# Patient Record
Sex: FEMALE | Race: BLACK OR AFRICAN AMERICAN | Employment: OTHER | URBAN - METROPOLITAN AREA
[De-identification: names, ages, dates, MRNs, and addresses within clinical notes are randomized per-mention and may not be internally consistent; named-entity substitution may affect disease eponyms.]

---

## 2018-03-29 LAB
CREATININE, EXTERNAL: 0.92
HBA1C MFR BLD HPLC: 5.7 %
LDL-C, EXTERNAL: 132

## 2018-05-30 ENCOUNTER — OFFICE VISIT (OUTPATIENT)
Dept: INTERNAL MEDICINE CLINIC | Age: 58
End: 2018-05-30

## 2018-05-30 VITALS
HEIGHT: 62 IN | DIASTOLIC BLOOD PRESSURE: 82 MMHG | OXYGEN SATURATION: 98 % | HEART RATE: 77 BPM | WEIGHT: 183.8 LBS | TEMPERATURE: 98.2 F | SYSTOLIC BLOOD PRESSURE: 118 MMHG | BODY MASS INDEX: 33.82 KG/M2 | RESPIRATION RATE: 15 BRPM

## 2018-05-30 DIAGNOSIS — R21 FACIAL RASH: ICD-10-CM

## 2018-05-30 DIAGNOSIS — A60.00 GENITAL HERPES SIMPLEX, UNSPECIFIED SITE: ICD-10-CM

## 2018-05-30 DIAGNOSIS — K75.81 NASH (NONALCOHOLIC STEATOHEPATITIS): ICD-10-CM

## 2018-05-30 DIAGNOSIS — L80 VITILIGO: ICD-10-CM

## 2018-05-30 DIAGNOSIS — L30.9 ECZEMA, UNSPECIFIED TYPE: Primary | ICD-10-CM

## 2018-05-30 RX ORDER — TRIAMCINOLONE ACETONIDE 1 MG/G
CREAM TOPICAL 2 TIMES DAILY
Qty: 45 G | Refills: 11 | Status: SHIPPED | OUTPATIENT
Start: 2018-05-30 | End: 2022-09-29

## 2018-05-30 RX ORDER — IBUPROFEN 600 MG/1
TABLET ORAL
COMMUNITY

## 2018-05-30 RX ORDER — VALACYCLOVIR HYDROCHLORIDE 500 MG/1
TABLET, FILM COATED ORAL 2 TIMES DAILY
COMMUNITY
End: 2018-07-30 | Stop reason: SDUPTHER

## 2018-05-30 RX ORDER — POLYETHYLENE GLYCOL 3350 17 G/17G
17 POWDER, FOR SOLUTION ORAL
COMMUNITY
End: 2021-07-14

## 2018-05-30 RX ORDER — VITAMIN E 268 MG
400 CAPSULE ORAL DAILY
COMMUNITY
End: 2021-07-14

## 2018-05-30 NOTE — MR AVS SNAPSHOT
Blu Kurk 
 
 
 [address] [facility], [address] 
[phone] Patient: Jon Ventura MRN: [id number] Visit Information Date & Time Provider Department Dept. Phone Encounter #  
 2018  2:00 PM Gem Kirby MD [facility] [address] [place] [address] [phone] [id number] Follow-up Instructions Return in about 2 months (around 2018) for rash. Your Appointments 2018  2:00 PM  
Office Visit with Gem Kirby MD  
[facility] [address] [place] [address] [address] [facility]) Appt Note: 2 month f/u  
 [address] [facility], [address] Alphonso Cervantes [address]  
  
   
 [address] [facility] Upcoming Health Maintenance Date Due Hepatitis C Screening 1960 DTaP/Tdap/Td series (1 - Tdap) 1983 PAP AKA CERVICAL CYTOLOGY 1983 BREAST CANCER SCRN MAMMOGRAM 2012 FOBT Q 1 YEAR AGE 50-75 2012 Influenza Age 5 to Adult 2018 Allergies as of 2018  Review Complete On: 2018 By: Gem Kirby MD  
 No Known Allergies Current Immunizations  Never Reviewed No immunizations on file. Not reviewed this visit You Were Diagnosed With   
  
 Codes Comments Eczema, unspecified type    -  Primary ICD-10-CM: L30.9 ICD-9-CM: 692.9 Vitiligo     ICD-10-CM: L80 
ICD-9-CM: 709.01 Facial rash     ICD-10-CM: R21 
ICD-9-CM: 782.1 Vitals BP Pulse Temp Resp Height(growth percentile) Weight(growth percentile) 118/82 (BP 1 Location: Left arm, BP Patient Position: Sitting) 77 98.2 °F (36.8 °C) (Oral) 15 5' 2\" (1.575 m) 183 lb 12.8 oz (83.4 kg) SpO2 BMI Smoking Status 98% 33.62 kg/m2 Never Smoker Vitals History BMI and BSA Data Body Mass Index Body Surface Area  
 33.62 kg/m 2 1.91 m 2 Preferred Pharmacy Pharmacy Name Phone [address] [place] [address] [phone] Your Updated Medication List  
  
   
This list is accurate as of 5/30/18  2:48 PM.  Always use your most recent med list.  
  
  
  
  
 ibuprofen 600 mg tablet Commonly known as:  MOTRIN Take  by mouth every six (6) hours as needed for Pain. MIRALAX 17 gram packet Generic drug:  polyethylene glycol Take 17 g by mouth daily. triamcinolone acetonide 0.1 % topical cream  
Commonly known as:  KENALOG Apply  to affected area two (2) times a day. use thin layer TUMS PO Take  by mouth. VALTREX 500 mg tablet Generic drug:  valACYclovir Take  by mouth two (2) times a day. vitamin E 400 unit capsule Commonly known as:  [address] Take  by mouth daily. Prescriptions Sent to Pharmacy Refills  
 triamcinolone acetonide (KENALOG) 0.1 % topical cream 11 Sig: Apply  to affected area two (2) times a day. use thin layer Class: Normal  
 Pharmacy: [address] [place] [address] #: [phone] Route: Topical  
  
We Performed the Following REFERRAL TO DERMATOLOGY [REF19 Custom] Follow-up Instructions Return in about 2 months (around 7/30/2018) for rash. Referral Information Referral ID Referred By Referred To  
  
 [id number] Wicho Haney Not Available Visits Status Start Date End Date 1 New Request 5/30/18 5/30/19 If your referral has a status of pending review or denied, additional information will be sent to support the outcome of this decision. Patient Instructions Atopic Dermatitis: Care Instructions Your Care Instructions Atopic dermatitis (also called eczema) is a skin problem that causes intense itching and a red, raised rash. In severe cases, the rash develops clear fluid-filled blisters. The rash is not contagious.  People with this condition seem to have very sensitive immune systems that are likely to react to things that cause allergies. The immune system is the body's way of fighting infection. There is no cure for atopic dermatitis, but you may be able to control it with care at home. Follow-up care is a key part of your treatment and safety. Be sure to make and go to all appointments, and call your doctor if you are having problems. It's also a good idea to know your test results and keep a list of the medicines you take. How can you care for yourself at home? · Use moisturizer at least twice a day. · If your doctor prescribes a cream, use it as directed. If your doctor prescribes other medicine, take it exactly as directed. · Wash the affected area with water only. Soap can make dryness and itching worse. Pat dry. · Apply a moisturizer after bathing. Use a cream such as Lubriderm, Moisturel, or Cetaphil that does not irritate the skin or cause a rash. Apply the cream while your skin is still damp after lightly drying with a towel. · Use cold, wet cloths to reduce itching. · Keep cool, and stay out of the sun. · If itching affects your normal activities, an over-the-counter antihistamine, such as diphenhydramine (Benadryl) or loratadine (Claritin) may help. Read and follow all instructions on the label. When should you call for help? Call your doctor now or seek immediate medical care if: 
? · Your rash gets worse and you have a fever. ? · You have new blisters or bruises, or the rash spreads and looks like a sunburn. ? · You have signs of infection, such as: 
¨ Increased pain, swelling, warmth, or redness. ¨ Red streaks leading from the rash. ¨ Pus draining from the rash. ¨ A fever. ? · You have crusting or oozing sores. ? · You have joint aches or body aches along with your rash. ? Watch closely for changes in your health, and be sure to contact your doctor if: ? · Your rash does not clear up after 2 to 3 weeks of home treatment. ? · Itching interferes with your sleep or daily activities. Where can you learn more? Go to http://yadi-roderick.info/. Enter Q206 in the search box to learn more about \"Atopic Dermatitis: Care Instructions. \" Current as of: October 13, 2016 Content Version: 11.4 © 8152-5362 [employer]. Care instructions adapted under license by [employer] (which disclaims liability or warranty for this information). If you have questions about a medical condition or this instruction, always ask your healthcare professional. [address] any warranty or liability for your use of this information. Vitiligo: Care Instructions Your Care Instructions Vitiligo (say \"vi-tuh-LY-go\") is a skin problem that happens when cells that make pigment are destroyed. Pigment gives skin its color. You may have white patches on areas of your body. The hair in these places may turn white. Sometimes, the white patches spread. Doctors don't know what causes this problem. It may run in families. This means that a child may be more likely to get it if a parent has it. If you decide to treat this problem, treatment can take a long time to work, and it may not work at all. Follow-up care is a key part of your treatment and safety. Be sure to make and go to all appointments, and call your doctor if you are having problems. It's also a good idea to know your test results and keep a list of the medicines you take. How can you care for yourself at home? · Put creams or ointments on your skin as directed by your doctor. Be careful if you put them around your eyes, nose, or mouth. · Take your medicines exactly as prescribed. Call your doctor if you have any problems with your medicine. · If you have light therapy, your skin will be exposed to a special light. Follow your doctor's directions on caring for your skin. · Protect your skin from the sun. It is most important to protect the white patches. Use sunscreen, hats with wide brims, sunglasses, and clothing that covers your arms and legs. · Talk to your doctor about sunless tanning products. You can buy these without a prescription. When should you call for help? Watch closely for changes in your health, and be sure to contact your doctor if: 
? · The skin changes are getting worse. ? · You do not get better as expected. Where can you learn more? Go to http://yadi-roderick.info/. Enter B241 in the search box to learn more about \"Vitiligo: Care Instructions. \" Current as of: October 13, 2016 Content Version: 11.4 © 0333-2186 [employer]. Care instructions adapted under license by [employer] (which disclaims liability or warranty for this information). If you have questions about a medical condition or this instruction, always ask your healthcare professional. [address] any warranty or liability for your use of this information. Introducing [facility]! Thelma Nieto introduces Cubeacon patient portal. Now you can access parts of your medical record, email your doctor's office, and request medication refills online. 1. In your internet browser, go to [email or web address]://[email or web address]. [employer] 2. Click on the First Time User? Click Here link in the Sign In box. You will see the New Member Sign Up page. 3. Enter your Cubeacon Access Code exactly as it appears below. You will not need to use this code after youve completed the sign-up process. If you do not sign up before the expiration date, you must request a new code. · Cubeacon Access Code: [email or web address] Expires: 8/28/2018  1:34 PM 
 
4.  Enter the last four digits of your Social Security Number (xxxx) and Date of Birth (mm/dd/yyyy) as indicated and click Submit. You will be taken to the next sign-up page. 5. Create a [employer] ID. This will be your [employer] login ID and cannot be changed, so think of one that is secure and easy to remember. 6. Create a [employer] password. You can change your password at any time. 7. Enter your Password Reset Question and Answer. This can be used at a later time if you forget your password. 8. Enter your e-mail address. You will receive e-mail notification when new information is available in [address] 9. Click Sign Up. You can now view and download portions of your medical record. 10. Click the Download Summary menu link to download a portable copy of your medical information. If you have questions, please visit the Frequently Asked Questions section of the [employer] website. Remember, [employer] is NOT to be used for urgent needs. For medical emergencies, dial 911. Now available from your iPhone and Android! Please provide this summary of care documentation to your next provider. Your primary care clinician is listed as Iris Elmore. If you have any questions after today's visit, please call [phone].

## 2018-05-30 NOTE — PATIENT INSTRUCTIONS
Atopic Dermatitis: Care Instructions  Your Care Instructions  Atopic dermatitis (also called eczema) is a skin problem that causes intense itching and a red, raised rash. In severe cases, the rash develops clear fluid-filled blisters. The rash is not contagious. People with this condition seem to have very sensitive immune systems that are likely to react to things that cause allergies. The immune system is the body's way of fighting infection. There is no cure for atopic dermatitis, but you may be able to control it with care at home. Follow-up care is a key part of your treatment and safety. Be sure to make and go to all appointments, and call your doctor if you are having problems. It's also a good idea to know your test results and keep a list of the medicines you take. How can you care for yourself at home? · Use moisturizer at least twice a day. · If your doctor prescribes a cream, use it as directed. If your doctor prescribes other medicine, take it exactly as directed. · Wash the affected area with water only. Soap can make dryness and itching worse. Pat dry. · Apply a moisturizer after bathing. Use a cream such as Lubriderm, Moisturel, or Cetaphil that does not irritate the skin or cause a rash. Apply the cream while your skin is still damp after lightly drying with a towel. · Use cold, wet cloths to reduce itching. · Keep cool, and stay out of the sun. · If itching affects your normal activities, an over-the-counter antihistamine, such as diphenhydramine (Benadryl) or loratadine (Claritin) may help. Read and follow all instructions on the label. When should you call for help? Call your doctor now or seek immediate medical care if:  ? · Your rash gets worse and you have a fever. ? · You have new blisters or bruises, or the rash spreads and looks like a sunburn. ? · You have signs of infection, such as:  ¨ Increased pain, swelling, warmth, or redness.   ¨ Red streaks leading from the rash.  ¨ Pus draining from the rash. ¨ A fever. ? · You have crusting or oozing sores. ? · You have joint aches or body aches along with your rash. ? Watch closely for changes in your health, and be sure to contact your doctor if:  ? · Your rash does not clear up after 2 to 3 weeks of home treatment. ? · Itching interferes with your sleep or daily activities. Where can you learn more? Go to http://yadi-roderick.info/. Enter S370 in the search box to learn more about \"Atopic Dermatitis: Care Instructions. \"  Current as of: October 13, 2016  Content Version: 11.4  © 9102-0665 [employer]. Care instructions adapted under license by [employer] (which disclaims liability or warranty for this information). If you have questions about a medical condition or this instruction, always ask your healthcare professional. [address] any warranty or liability for your use of this information. Vitiligo: Care Instructions  Your Care Instructions  Vitiligo (say \"vi-tuh-LY-go\") is a skin problem that happens when cells that make pigment are destroyed. Pigment gives skin its color. You may have white patches on areas of your body. The hair in these places may turn white. Sometimes, the white patches spread. Doctors don't know what causes this problem. It may run in families. This means that a child may be more likely to get it if a parent has it. If you decide to treat this problem, treatment can take a long time to work, and it may not work at all. Follow-up care is a key part of your treatment and safety. Be sure to make and go to all appointments, and call your doctor if you are having problems. It's also a good idea to know your test results and keep a list of the medicines you take. How can you care for yourself at home? · Put creams or ointments on your skin as directed by your doctor.  Be careful if you put them around your eyes, nose, or mouth.  · Take your medicines exactly as prescribed. Call your doctor if you have any problems with your medicine. · If you have light therapy, your skin will be exposed to a special light. Follow your doctor's directions on caring for your skin. · Protect your skin from the sun. It is most important to protect the white patches. Use sunscreen, hats with wide brims, sunglasses, and clothing that covers your arms and legs. · Talk to your doctor about sunless tanning products. You can buy these without a prescription. When should you call for help? Watch closely for changes in your health, and be sure to contact your doctor if:  ? · The skin changes are getting worse. ? · You do not get better as expected. Where can you learn more? Go to http://yadi-roderick.info/. Enter B241 in the search box to learn more about \"Vitiligo: Care Instructions. \"  Current as of: October 13, 2016  Content Version: 11.4  © 3750-9291 [employer]wise[employer]. Care instructions adapted under license by [employer] (which disclaims liability or warranty for this information). If you have questions about a medical condition or this instruction, always ask your healthcare professional. [address] any warranty or liability for your use of this information.

## 2018-05-30 NOTE — PROGRESS NOTES
Chief Complaint   Patient presents with   [facility] Establish Care    Other     spots on face, times 3 months, \"getting bigger\"

## 2018-05-30 NOTE — PROGRESS NOTES
[facility]:  5/31/2018, MRN: [id number]      Shilpa Obrien is a 66 y.o. female and presents to clinic to Silas Puente and Other (spots on face, times 3 months, \"getting bigger\")    Subjective: The patient is a 79-year-old female with history of eczema, vitiligo, genital herpes, abnormal Pap smear, and BRAGG. 1.  Facial Rash: The patient reports swelling under bilateral orbits for 6 months. There is a left bump under her left eye is nontender to palpation. It does not itch. No alleviating factors are known for the swollen areas and the bump inferior to her left orbit. No aggravating factors are known. She has no sneezing, cough, itchy or red eyes, or ear pain. No hearing loss. No vision changes. 2.  Vitiligo: Present along her left lower extremity for several months at least.  Not associated with pain or itching. No alleviating factors are known. No triggers are known. No aggravating factors are known. 3.  Eczema: Present for 2 years along her right lower extremity. The area it is associated with pruritus. The patient admits to scratching this area quite frequently. No triggers are known. 4. BRAGG:  Diagnosed in 2017. She does not regularly exercise. She is on a heart healthy diet. Her weight today is 183 pounds. 5.  Genital Herpes History: The patient takes Valtrex as needed. She reports no adverse side effects from this medication. In the past, she had a rash along her genitalia that was thought to be secondary to herpes. She is asymptomatic today. 6.  Health Maintenance:  -Her last Pap smear was in 2013. She states that she had an abnormal Pap smear when younger. She never had colposcopy. Follow-up Pap smears nevertheless were unremarkable.  -Her last colonoscopy was just a month ago. Per her history was unremarkable. She has a history of colon polyps though. -Her last mammogram was in March of this year. It was unremarkable per patient history.   She denies history of abnormal mammograms  -No tobacco use. No drug use. No energy drink consumption. No alcohol beverage consumption. Patient Active Problem List    Diagnosis Date Noted    Eczema 05/31/2018    Vitiligo 05/31/2018    Facial rash 05/31/2018    BRAGG (nonalcoholic steatohepatitis) 05/31/2018    Genital herpes 05/31/2018       Current Outpatient Prescriptions   Medication Sig Dispense Refill    polyethylene glycol (MIRALAX) 17 gram packet Take 17 g by mouth daily.  ibuprofen (MOTRIN) 600 mg tablet Take  by mouth every six (6) hours as needed for Pain.  valACYclovir (VALTREX) 500 mg tablet Take  by mouth two (2) times a day.  calcium carbonate (TUMS PO) Take  by mouth.  vitamin E (AQUA GEMS) 400 unit capsule Take  by mouth daily.  triamcinolone acetonide (KENALOG) 0.1 % topical cream Apply  to affected area two (2) times a day. use thin layer 45 g 11       No Known Allergies    Past Medical History:   Diagnosis Date    Fatty liver     GERD (gastroesophageal reflux disease)        History reviewed. No pertinent surgical history. Family History   Problem Relation Age of Onset    Hypertension Mother     Heart Disease Mother     Diabetes Father     Stroke Father     Heart Attack Father     Diabetes Sister     Diabetes Brother     Heart Attack Brother        Social History   Substance Use Topics    Smoking status: Never Smoker    Smokeless tobacco: Never Used    Alcohol use No       ROS   Review of Systems   Constitutional: Negative for chills and fever. HENT: Negative for ear pain and sore throat. Eyes: Negative for blurred vision and pain. Respiratory: Negative for cough and shortness of breath. Cardiovascular: Negative for chest pain. Gastrointestinal: Negative for abdominal pain, blood in stool and melena. Genitourinary: Negative for dysuria and hematuria. Musculoskeletal: Negative for joint pain and myalgias.    Skin: Positive for itching and rash. Neurological: Negative for tingling, focal weakness and headaches. Endo/Heme/Allergies: Does not bruise/bleed easily. Psychiatric/Behavioral: Negative for substance abuse. Objective     Vitals:    05/30/18 1357   BP: 118/82   Pulse: 77   Resp: 15   Temp: 98.2 °F (36.8 °C)   TempSrc: Oral   SpO2: 98%   Weight: 183 lb 12.8 oz (83.4 kg)   Height: 5' 2\" (1.575 m)   PainSc:   0 - No pain       Physical Exam   Constitutional: She is oriented to person, place, and time and well-developed, well-nourished, and in no distress. HENT:   Head: Normocephalic and atraumatic. Right Ear: External ear normal.   Left Ear: External ear normal.   Nose: Nose normal.   Mouth/Throat: Oropharynx is clear and moist. No oropharyngeal exudate. +B/l allergic shiners. Clear TMs   Eyes: Conjunctivae and EOM are normal. Pupils are equal, round, and reactive to light. Right eye exhibits no discharge. Left eye exhibits no discharge. No scleral icterus. Neck: Neck supple. Cardiovascular: Normal rate, regular rhythm, normal heart sounds and intact distal pulses. Exam reveals no gallop and no friction rub. No murmur heard. Pulmonary/Chest: Effort normal and breath sounds normal. No respiratory distress. She has no wheezes. She has no rales. Abdominal: Soft. Bowel sounds are normal. She exhibits no distension. There is no tenderness. There is no rebound and no guarding. No organomegaly   Musculoskeletal: She exhibits no edema or tenderness (BUE). Lymphadenopathy:     She has no cervical adenopathy. Neurological: She is alert and oriented to person, place, and time. She exhibits normal muscle tone. Gait normal.   Skin: Skin is warm and dry. No erythema. There is very minimal edema of the bilateral orbits. There is no erythema. She has a small papule under her left orbit. It is nontender to palpation. There are hypopigmented spots along her left lower extremity. There are nontender to palpation.   There is no erythema. She has hyperpigmentation along her right lower extremity tibial bone, nontender palpation. There is no erythema present. Psychiatric: Affect normal.   Nursing note and vitals reviewed. Assessment/Plan:   1. Eczema: Along her RLE  -Prescribing triamcinolone cream.  -Placing a referral to Dermatology per pt request.    ORDERS:  - triamcinolone acetonide (KENALOG) 0.1 % topical cream; Apply  to affected area two (2) times a day. use thin layer  Dispense: 45 g; Refill: 11  - REFERRAL TO DERMATOLOGY    2. Vitiligo: Along her LLE.  - Placing a referral to Dermatology per pt hx. We discussed the diagnosis of vitiligo. All questions were answered. ORDERS:  - REFERRAL TO DERMATOLOGY    3. Facial rash: From seasonal allergies?  -I instructed her to take a nasal steroid over-the-counter for presumed seasonal allergies. -I am also placing a referral to dermatology per patient request.    ORDERS:  - REFERRAL TO DERMATOLOGY    4.H/o Genital Herpes: Stable.  -Okay to continue with Valtrex as needed. Observation. 5. BRAGG: Her weight is 183lbs. -I encouraged her to reduce her fat food intake and to exercise regularly as a means of resolving her BRAGG. We discussed the diagnosis. All questions were answered. I will recheck her weight at her follow-up appointment. 6.  Health Maintenance:  -Requesting records from her previous PCP, including her vaccine records, most recent results of labs (per patient history, these were done within the past year), her last mammogram, last colonoscopy, and last Pap smear.         Health Maintenance Due   Topic Date Due    Hepatitis C Screening  1960    DTaP/Tdap/Td series (1 - Tdap) 01/21/1983    PAP AKA CERVICAL CYTOLOGY  01/21/1983    BREAST CANCER SCRN MAMMOGRAM  01/21/2012    FOBT Q 1 YEAR AGE 50-75  01/21/2012     Lab review: labs are reviewed in the EHR    I have discussed the diagnosis with the patient and the intended plan as seen in the above orders. The patient has received an after-visit summary and questions were answered concerning future plans. I have discussed medication side effects and warnings with the patient as well. I have reviewed the plan of care with the patient, accepted their input and they are in agreement with the treatment goals. All questions were answered. The patient understands the plan of care. Handouts provided today with above information. Pt instructed if symptoms worsen to call the office or report to the ED for continued care. Greater than 50% of the visit time was spent in counseling and/or coordination of care. Follow-up Disposition:  Return in about 2 months (around 7/30/2018) for rash.     Al Chavez MD

## 2018-05-31 PROBLEM — K75.81 NASH (NONALCOHOLIC STEATOHEPATITIS): Status: ACTIVE | Noted: 2018-05-31

## 2018-05-31 PROBLEM — L80 VITILIGO: Status: ACTIVE | Noted: 2018-05-31

## 2018-05-31 PROBLEM — R21 FACIAL RASH: Status: ACTIVE | Noted: 2018-05-31

## 2018-05-31 PROBLEM — A60.00 GENITAL HERPES: Status: ACTIVE | Noted: 2018-05-31

## 2018-05-31 PROBLEM — L30.9 ECZEMA: Status: ACTIVE | Noted: 2018-05-31

## 2018-06-08 ENCOUNTER — TELEPHONE (OUTPATIENT)
Dept: INTERNAL MEDICINE CLINIC | Age: 58
End: 2018-06-08

## 2018-06-08 NOTE — TELEPHONE ENCOUNTER
Patient's insurance has denied [facility] they have sent her to [facility] dermatology message left on voicemail notifying patient of this

## 2018-06-08 NOTE — TELEPHONE ENCOUNTER
Pt says she was waiting on referral for derm. Referral tab says waiting for reply from [facility]. Please call pt with update.

## 2018-06-11 NOTE — TELEPHONE ENCOUNTER
LMTCB    Please notify the patient that insurance referral to [facility] has been denied. Patient approved at:  [address] [place] [address] [facility]  [facility]. Elissa Marshall [address]  Telephone: [phone]  MTF will contact patient to schedule an appointment.

## 2018-07-30 ENCOUNTER — OFFICE VISIT (OUTPATIENT)
Dept: INTERNAL MEDICINE CLINIC | Age: 58
End: 2018-07-30

## 2018-07-30 VITALS
BODY MASS INDEX: 33.57 KG/M2 | DIASTOLIC BLOOD PRESSURE: 81 MMHG | WEIGHT: 182.4 LBS | HEIGHT: 62 IN | SYSTOLIC BLOOD PRESSURE: 108 MMHG | TEMPERATURE: 98.3 F | RESPIRATION RATE: 12 BRPM | OXYGEN SATURATION: 96 % | HEART RATE: 79 BPM

## 2018-07-30 DIAGNOSIS — R21 FACIAL RASH: ICD-10-CM

## 2018-07-30 DIAGNOSIS — A60.00 GENITAL HERPES SIMPLEX, UNSPECIFIED SITE: Primary | ICD-10-CM

## 2018-07-30 DIAGNOSIS — Z12.4 ENCOUNTER FOR SCREENING FOR CERVICAL CANCER: ICD-10-CM

## 2018-07-30 DIAGNOSIS — Z13.220 SCREENING FOR HYPERLIPIDEMIA: ICD-10-CM

## 2018-07-30 DIAGNOSIS — K75.81 NASH (NONALCOHOLIC STEATOHEPATITIS): ICD-10-CM

## 2018-07-30 DIAGNOSIS — Z13.0 SCREENING FOR DEFICIENCY ANEMIA: ICD-10-CM

## 2018-07-30 DIAGNOSIS — R73.9 HYPERGLYCEMIA: ICD-10-CM

## 2018-07-30 DIAGNOSIS — Z11.59 NEED FOR HEPATITIS C SCREENING TEST: ICD-10-CM

## 2018-07-30 DIAGNOSIS — L30.9 ECZEMA, UNSPECIFIED TYPE: ICD-10-CM

## 2018-07-30 RX ORDER — VALACYCLOVIR HYDROCHLORIDE 500 MG/1
TABLET, FILM COATED ORAL
Qty: 60 TAB | Refills: 2 | Status: SHIPPED | OUTPATIENT
Start: 2018-07-30 | End: 2022-09-29

## 2018-07-30 NOTE — MR AVS SNAPSHOT
[facility] 
 
 
 [address], [address] [facility] 
[phone] Patient: Darlin Paz MRN: [id number] TMD:[birth year] Visit Information Date & Time Provider Department Dept. Phone Encounter #  
 7/30/2018  2:00 PM [facility]tha Lr MD [facility] [phone] [id number] Follow-up Instructions Return in about 11 months (around 7/9/2019) for check up. Your Appointments 7/2/2019  8:15 AM  
LAB with [facility] NURSE VISIT [facility] Appt Note: fasting labs [address], [address] [address] [address]  
  
   
 [address], [address]  
  
    
 7/9/2019  1:45 PM  
PHYSICAL with Aretha Lr MD  
[facility]) Appt Note: CPE  
 [address], [facility] [address] [address]  
  
   
 [address], [address] Upcoming Health Maintenance Date Due Hepatitis C Screening 1960 COLONOSCOPY 1/21/1980 PAP AKA CERVICAL CYTOLOGY 1/21/1983 DTaP/Tdap/Td series (2 - Td) 5/23/2018 Influenza Age 5 to Adult 8/1/2018 BREAST CANCER SCRN MAMMOGRAM 3/9/2020 Allergies as of 7/30/2018  Review Complete On: 7/30/2018 By: Aretha Lr MD  
 No Known Allergies Current Immunizations  Reviewed on 6/18/2018 Name Date Anthrax Vaccine 7/14/2003, 1/23/2003, 1/3/2003 Hep A Vaccine 10/29/1998, 10/30/1997 Hep B Vaccine 5/10/1989 IPV 10/7/1998, 10/7/1989, 3/9/1987, 2/26/1987 Influenza Vaccine 12/15/2006 TB Skin Test (PPD) Intradermal 6/6/2011 Td 1/30/1998 Tdap 5/23/2008 Typhoid Vaccine 12/10/2002, 6/19/2000 Yellow Fever Vaccine 1/30/1998 Not reviewed this visit You Were Diagnosed With   
  
 Codes Comments Eczema, unspecified type    -  Primary ICD-10-CM: L30.9 ICD-9-CM: 692.9 Hyperglycemia     ICD-10-CM: R73.9 ICD-9-CM: 790.29 Screening for hyperlipidemia     ICD-10-CM: Z13.220 ICD-9-CM: V77.91   
 BRAGG (nonalcoholic steatohepatitis)     ICD-10-CM: V58.02 ICD-9-CM: 571.8 Screening for deficiency anemia     ICD-10-CM: Z13.0 ICD-9-CM: V78.1 Facial rash     ICD-10-CM: R21 
ICD-9-CM: 782.1 Genital herpes simplex, unspecified site     ICD-10-CM: A60.00 ICD-9-CM: 054.10 Encounter for screening for cervical cancer      ICD-10-CM: Z12.4 ICD-9-CM: V76.2 Need for hepatitis C screening test     ICD-10-CM: Z11.59 
ICD-9-CM: V73.89 Vitals BP Pulse Temp Resp Height(growth percentile) Weight(growth percentile) 108/81 79 98.3 °F (36.8 °C) (Oral) 12 5' 2\" (1.575 m) 182 lb 6.4 oz (82.7 kg) SpO2 BMI Smoking Status 96% 33.36 kg/m2 Never Smoker Vitals History BMI and BSA Data Body Mass Index Body Surface Area  
 33.36 kg/m 2 1.9 m 2 Preferred Pharmacy Pharmacy Name Phone [address] [place] [address], [address] [place] [address] [phone] Your Updated Medication List  
  
   
This list is accurate as of 7/30/18  2:42 PM.  Always use your most recent med list.  
  
  
  
  
 ibuprofen 600 mg tablet Commonly known as:  MOTRIN Take  by mouth every six (6) hours as needed for Pain. MIRALAX 17 gram packet Generic drug:  polyethylene glycol Take 17 g by mouth daily. triamcinolone acetonide 0.1 % topical cream  
Commonly known as:  KENALOG Apply  to affected area two (2) times a day. use thin layer TUMS PO Take  by mouth. valACYclovir 500 mg tablet Commonly known as:  VALTREX You can take 500mg twice a day for 3 days as needed for each flair up.  
  
 varicella-zoster recombinant (PF) 50 mcg/0.5 mL Susr injection Commonly known as:  SHINGRIX (PF)  
0.5 mL by IntraMUSCular route every two (2) months for 2 doses. vitamin E 400 unit capsule Commonly known as:  [address] Take  by mouth daily. Prescriptions Printed Refills  
 varicella-zoster recombinant, PF, (SHINGRIX, PF,) 50 mcg/0.5 mL susr injection 1 Si.5 mL by IntraMUSCular route every two (2) months for 2 doses. Class: Print Route: IntraMUSCular Prescriptions Sent to Pharmacy Refills  
 valACYclovir (VALTREX) 500 mg tablet 2 Sig: You can take 500mg twice a day for 3 days as needed for each flair up. Class: Normal  
 Pharmacy: [address] [place] [address] #: [phone] We Performed the Following REFERRAL TO DERMATOLOGY [REF19 Custom] Comments: For a second opinion REFERRAL TO GYNECOLOGY [REF30 Custom] Follow-up Instructions Return in about 11 months (around 2019) for check up. To-Do List   
 2018 Lab:  HEPATITIS C AB   
  
 2019 Lab:  CBC WITH AUTOMATED DIFF Around 2019 Lab:  HEMOGLOBIN A1C W/O EAG   
  
 2019 Lab:  LIPID PANEL   
  
 2019 Lab:  METABOLIC PANEL, COMPREHENSIVE Referral Information Referral ID Referred By Referred To  
  
 [id number] Meli Mckeon Not Available Visits Status Start Date End Date 1 New Request 18 If your referral has a status of pending review or denied, additional information will be sent to support the outcome of this decision. Referral ID Referred By Referred To  
 [id number] Meli Roque Not Available Visits Status Start Date End Date 1 New Request 18 If your referral has a status of pending review or denied, additional information will be sent to support the outcome of this decision. Introducing [facility]! [facility] introduces [employer] patient portal. Now you can access parts of your medical record, email your doctor's office, and request medication refills online. 1. In your internet browser, go to [email or web address] 2. Click on the First Time User? Click Here link in the Sign In box. You will see the New Member Sign Up page. 3. Enter your [employer] Access Code exactly as it appears below. You will not need to use this code after youve completed the sign-up process. If you do not sign up before the expiration date, you must request a new code. · [employer] Access Code: [email or web address] Expires: 8/28/2018  1:34 PM 
 
4. Enter the last four digits of your Social Security Number (xxxx) and Date of Birth (mm/dd/yyyy) as indicated and click Submit. You will be taken to the next sign-up page. 5. Create a myMatrixxt ID. This will be your [employer] login ID and cannot be changed, so think of one that is secure and easy to remember. 6. Create a [employer] password. You can change your password at any time. 7. Enter your Password Reset Question and Answer. This can be used at a later time if you forget your password. 8. Enter your e-mail address. You will receive e-mail notification when new information is available in [address] 9. Click Sign Up. You can now view and download portions of your medical record. 10. Click the Download Summary menu link to download a portable copy of your medical information. If you have questions, please visit the Frequently Asked Questions section of the [employer] website. Remember, [employer] is NOT to be used for urgent needs. For medical emergencies, dial 911. Now available from your iPhone and Android! Please provide this summary of care documentation to your next provider. Your primary care clinician is listed as Stevie Maloney. If you have any questions after today's visit, please call [phone].

## 2018-07-30 NOTE — PROGRESS NOTES
[facility]:  7/30/2018, MRN: [id number]      Chun Reeves is a 66 y.o. female and presents to clinic for Dry Skin (Patient here for eczema and says  the treat from derm Dr did not  help.)    Subjective: The patient is a 72-year-old female with history of eczema, vitiligo, genital herpes, abnormal Pap smear, and BRAGG. 1. Eczema: She was referred to Dermatology at her last apt for an eczematous rash along her RLE. Triamcinolone topical rx was prescribed for symptomatic relief of associated pruritus. She went to the dermatology team since her last appointment he did not think any additional treatments were warranted for her skin findings. 2. BRAGG and Obesity: She was diagnosed with BRAGG in 2017. No excessive ETOH intake. Her last A1C measured 5.7. Her weight today is 182lbs. Wt Readings from Last 3 Encounters:   07/30/18 182 lb 6.4 oz (82.7 kg)   05/30/18 183 lb 12.8 oz (83.4 kg)     3. Facial Rash: Patient reported a six-month history of swelling along bilateral orbits. She also has hypopigmented areas under bilateral orbits. No alleviating factors are known. No aggravating factors are known. No allergy symptoms. No hearing loss. No vision changes. The areas are not associated with pain. The patient is interested in having a referral to a different dermatologist for a second opinion. At her most recent dermatology appointment, she was told that her skin findings are benign. No additional studies or treatments are warranted for these findings. 4.  Genital Herpes: Present for years. She has not had an outbreak with a rash for the past year. She takes Valtrex 1-2 tablets per month as needed for vaginal itching. It does not help with the itching though. The itching typically subsides on its own. She is presently symptomatic. She is asking for a refill on Valtrex. 5. Health Maintenance:  -Her last Pap smear was in 2013.   She has a history of an abnormal Pap but follow-up Pap smears were unremarkable. She denies ever having had a colposcopy procedure. She is overdue for her cervical cancer screening. Patient Active Problem List    Diagnosis Date Noted    Eczema 05/31/2018    Vitiligo 05/31/2018    Facial rash 05/31/2018    BRAGG (nonalcoholic steatohepatitis) 05/31/2018    Genital herpes 05/31/2018       Current Outpatient Prescriptions   Medication Sig Dispense Refill    valACYclovir (VALTREX) 500 mg tablet You can take 500mg twice a day for 3 days as needed for each flair up. 60 Tab 2    varicella-zoster recombinant, PF, (SHINGRIX, PF,) 50 mcg/0.5 mL susr injection 0.5 mL by IntraMUSCular route every two (2) months for 2 doses. 0.5 mL 1    polyethylene glycol (MIRALAX) 17 gram packet Take 17 g by mouth daily.  ibuprofen (MOTRIN) 600 mg tablet Take  by mouth every six (6) hours as needed for Pain.  calcium carbonate (TUMS PO) Take  by mouth.  vitamin E (AQUA GEMS) 400 unit capsule Take  by mouth daily.  triamcinolone acetonide (KENALOG) 0.1 % topical cream Apply  to affected area two (2) times a day. use thin layer 45 g 11       No Known Allergies    Past Medical History:   Diagnosis Date    Fatty liver     GERD (gastroesophageal reflux disease)        History reviewed. No pertinent surgical history. Family History   Problem Relation Age of Onset    Hypertension Mother     Heart Disease Mother     Diabetes Father     Stroke Father     Heart Attack Father     Diabetes Sister     Diabetes Brother     Heart Attack Brother        Social History   Substance Use Topics    Smoking status: Never Smoker    Smokeless tobacco: Never Used    Alcohol use No       ROS   Review of Systems   Constitutional: Negative for chills and fever. HENT: Negative for ear pain and sore throat. Eyes: Negative for blurred vision and pain. Respiratory: Negative for cough and shortness of breath. Cardiovascular: Negative for chest pain. Gastrointestinal: Negative for abdominal pain, blood in stool and melena. Genitourinary: Negative for dysuria and hematuria. Musculoskeletal: Negative for joint pain and myalgias. Skin: Positive for rash. Neurological: Negative for tingling, focal weakness and headaches. Endo/Heme/Allergies: Does not bruise/bleed easily. Psychiatric/Behavioral: Negative for substance abuse. Objective     Vitals:    07/30/18 1413   BP: 108/81   Pulse: 79   Resp: 12   Temp: 98.3 °F (36.8 °C)   TempSrc: Oral   SpO2: 96%   Weight: 182 lb 6.4 oz (82.7 kg)   Height: 5' 2\" (1.575 m)   PainSc:   0 - No pain       Physical Exam   Constitutional: She is oriented to person, place, and time and well-developed, well-nourished, and in no distress. HENT:   Head: Normocephalic and atraumatic. Right Ear: External ear normal.   Left Ear: External ear normal.   Nose: Nose normal.   Mouth/Throat: Oropharynx is clear and moist. No oropharyngeal exudate. Eyes: Conjunctivae and EOM are normal. Pupils are equal, round, and reactive to light. Right eye exhibits no discharge. Left eye exhibits no discharge. No scleral icterus. Neck: Neck supple. Cardiovascular: Normal rate, regular rhythm, normal heart sounds and intact distal pulses. Exam reveals no gallop and no friction rub. No murmur heard. Pulmonary/Chest: Effort normal and breath sounds normal. No respiratory distress. She has no wheezes. She has no rales. Abdominal: Soft. Bowel sounds are normal. She exhibits no distension. There is no tenderness. There is no rebound and no guarding. Musculoskeletal: She exhibits no edema or tenderness (BUE). Lymphadenopathy:     She has no cervical adenopathy. Neurological: She is alert and oriented to person, place, and time. She exhibits normal muscle tone. Gait normal.   Skin: Skin is warm and dry. No erythema. Her skin exam is unchanged.  There are hypopigmented macules beneath b/l orbits   Psychiatric: Affect normal. Nursing note and vitals reviewed. LABS   Lab Results   Component Value Date/Time    Hemoglobin A1c, External 5.7 03/29/2018       Assessment/Plan:   1. BRAGG and Obesity:   -I encouraged her to reduce her processed food intake. I will recheck her weight at her follow-up appointment.  -Checking a CMP and A1c just before her f/u apt. I will also check a CBC and a lipid panel just before her f/u apt. ORDERS:  - METABOLIC PANEL, COMPREHENSIVE; Future  - HEMOGLOBIN A1C W/O EAG; Future  - CBC WITH AUTOMATED DIFF; Future  - LIPID PANEL; Future    2. Eczema Hx and Facial Rash: Benign findings per my skin exam today. -Requesting a referral to Dermatology per pt request for a second opinion    ORDERS:  - REFERRAL TO DERMATOLOGY    3. Genital herpes simplex  - Valtrex course prescribed prn rash (no itching sx); she was instructed to take 500mg bid for 3 days for each flair up and to RTC if she has frequent flair ups. ORDERS:  - valACYclovir (VALTREX) 500 mg tablet; You can take 500mg twice a day for 3 days as needed for each flair up. Dispense: 60 Tab; Refill: 2    4. Health Maintenance:  -Requesting a referral to gynecology for cervical cancer screening.  -Ordering a hepatitis C screening just before her follow-up appointment.  -Requesting her last colonoscopy. -She will need a mammogram ordered at her follow-up appointment. I offered to order this today but she declined.  -I encouraged her to get her flu shot later this year.  -I recommended that she get that she get the shingrix vaccination. ORDERS:  - REFERRAL TO GYNECOLOGY  - HEPATITIS C AB;  Future        Health Maintenance Due   Topic Date Due    Hepatitis C Screening  1960    COLONOSCOPY  01/21/1980    PAP AKA CERVICAL CYTOLOGY  01/21/1983    DTaP/Tdap/Td series (2 - Td) 05/23/2018     Lab review: labs are reviewed in the EHR and ordered as mentioned above    I have discussed the diagnosis with the patient and the intended plan as seen in the above orders. The patient has received an after-visit summary and questions were answered concerning future plans. I have discussed medication side effects and warnings with the patient as well. I have reviewed the plan of care with the patient, accepted their input and they are in agreement with the treatment goals. All questions were answered. The patient understands the plan of care. Handouts provided today with above information. Pt instructed if symptoms worsen to call the office or report to the ED for continued care. Greater than 50% of the visit time was spent in counseling and/or coordination of care. Voice recognition was used to generate this report, which may have resulted in some phonetic based errors in grammar and contents. Even though attempts were made to correct all the mistakes, some may have been missed, and remained in the body of the document. Follow-up Disposition:  Return in about 11 months (around 7/9/2019) for check up.     bAner Sarah MD

## 2019-03-06 ENCOUNTER — TELEPHONE (OUTPATIENT)
Dept: INTERNAL MEDICINE CLINIC | Age: 59
End: 2019-03-06

## 2019-03-06 DIAGNOSIS — Z12.31 ENCOUNTER FOR SCREENING MAMMOGRAM FOR MALIGNANT NEOPLASM OF BREAST: Primary | ICD-10-CM

## 2019-03-06 NOTE — TELEPHONE ENCOUNTER
Patient is requesting that we fax an order for mammogram to [facility].     She was not able to provide a fax number.

## 2019-03-06 NOTE — TELEPHONE ENCOUNTER
Mammogram order generated and faxed to [facility] at [phone] per patients request.   LVM for patient to call [facility] at [phone] to schedule.

## 2019-07-03 LAB
ALBUMIN SERPL-MCNC: 4.6 G/DL (ref 3.5–5.5)
ALBUMIN/GLOB SERPL: 1.5 {RATIO} (ref 1.2–2.2)
ALP SERPL-CCNC: 66 IU/L (ref 39–117)
ALT SERPL-CCNC: 31 IU/L (ref 0–32)
AST SERPL-CCNC: 26 IU/L (ref 0–40)
BASOPHILS # BLD AUTO: 0 X10E3/UL (ref 0–0.2)
BASOPHILS NFR BLD AUTO: 0 %
BILIRUB SERPL-MCNC: 0.7 MG/DL (ref 0–1.2)
BUN SERPL-MCNC: 9 MG/DL (ref 6–24)
BUN/CREAT SERPL: 9 (ref 9–23)
CALCIUM SERPL-MCNC: 9.7 MG/DL (ref 8.7–10.2)
CHLORIDE SERPL-SCNC: 102 MMOL/L (ref 96–106)
CHOLEST SERPL-MCNC: 189 MG/DL (ref 100–199)
CO2 SERPL-SCNC: 23 MMOL/L (ref 20–29)
CREAT SERPL-MCNC: 0.99 MG/DL (ref 0.57–1)
EOSINOPHIL # BLD AUTO: 0.1 X10E3/UL (ref 0–0.4)
EOSINOPHIL NFR BLD AUTO: 1 %
ERYTHROCYTE [DISTWIDTH] IN BLOOD BY AUTOMATED COUNT: 12.8 % (ref 12.3–15.4)
GLOBULIN SER CALC-MCNC: 3.1 G/DL (ref 1.5–4.5)
GLUCOSE SERPL-MCNC: 152 MG/DL (ref 65–99)
HBA1C MFR BLD: 6 % (ref 4.8–5.6)
HCT VFR BLD AUTO: 39.4 % (ref 34–46.6)
HCV AB S/CO SERPL IA: <0.1 S/CO RATIO (ref 0–0.9)
HDLC SERPL-MCNC: 39 MG/DL
HGB BLD-MCNC: 12.9 G/DL (ref 11.1–15.9)
IMM GRANULOCYTES # BLD AUTO: 0 X10E3/UL (ref 0–0.1)
IMM GRANULOCYTES NFR BLD AUTO: 0 %
INTERPRETATION, 910389: NORMAL
LDLC SERPL CALC-MCNC: 127 MG/DL (ref 0–99)
LYMPHOCYTES # BLD AUTO: 2.4 X10E3/UL (ref 0.7–3.1)
LYMPHOCYTES NFR BLD AUTO: 42 %
MCH RBC QN AUTO: 32.6 PG (ref 26.6–33)
MCHC RBC AUTO-ENTMCNC: 32.7 G/DL (ref 31.5–35.7)
MCV RBC AUTO: 100 FL (ref 79–97)
MONOCYTES # BLD AUTO: 0.3 X10E3/UL (ref 0.1–0.9)
MONOCYTES NFR BLD AUTO: 5 %
NEUTROPHILS # BLD AUTO: 2.9 X10E3/UL (ref 1.4–7)
NEUTROPHILS NFR BLD AUTO: 52 %
PLATELET # BLD AUTO: 229 X10E3/UL (ref 150–450)
POTASSIUM SERPL-SCNC: 4.8 MMOL/L (ref 3.5–5.2)
PROT SERPL-MCNC: 7.7 G/DL (ref 6–8.5)
RBC # BLD AUTO: 3.96 X10E6/UL (ref 3.77–5.28)
SODIUM SERPL-SCNC: 139 MMOL/L (ref 134–144)
TRIGL SERPL-MCNC: 117 MG/DL (ref 0–149)
VLDLC SERPL CALC-MCNC: 23 MG/DL (ref 5–40)
WBC # BLD AUTO: 5.6 X10E3/UL (ref 3.4–10.8)

## 2019-07-03 NOTE — PROGRESS NOTES
I will discuss her results with her at her upcoming appointment. Her CBC is unremarkable. She does not have hepatitis C. Her liver and kidney studies results are normal.  Her total cholesterol is 189. Her triglycerides are 170. Her HDL is 39. Her LDL is 127. Her A1c is 6.     Dr. Hollie Richard  [facility], [address] [place] [address], [address] [place] [address] Str.  Phone: [phone]  Fax: [phone]

## 2019-07-09 ENCOUNTER — OFFICE VISIT (OUTPATIENT)
Dept: INTERNAL MEDICINE CLINIC | Age: 59
End: 2019-07-09

## 2019-07-09 ENCOUNTER — TELEPHONE (OUTPATIENT)
Dept: INTERNAL MEDICINE CLINIC | Age: 59
End: 2019-07-09

## 2019-07-09 ENCOUNTER — HOSPITAL ENCOUNTER (OUTPATIENT)
Dept: GENERAL RADIOLOGY | Age: 59
Discharge: HOME | End: 2019-07-09
Payer: OTHER GOVERNMENT

## 2019-07-09 VITALS
OXYGEN SATURATION: 96 % | BODY MASS INDEX: 33.75 KG/M2 | RESPIRATION RATE: 16 BRPM | WEIGHT: 183.4 LBS | DIASTOLIC BLOOD PRESSURE: 76 MMHG | HEIGHT: 62 IN | SYSTOLIC BLOOD PRESSURE: 112 MMHG | TEMPERATURE: 97.9 F | HEART RATE: 92 BPM

## 2019-07-09 DIAGNOSIS — E78.2 MIXED HYPERLIPIDEMIA: ICD-10-CM

## 2019-07-09 DIAGNOSIS — Z87.42 HISTORY OF ABNORMAL CERVICAL PAP SMEAR: ICD-10-CM

## 2019-07-09 DIAGNOSIS — R07.89 RIGHT-SIDED CHEST WALL PAIN: ICD-10-CM

## 2019-07-09 DIAGNOSIS — R10.9 RIGHT SIDED ABDOMINAL PAIN: Primary | ICD-10-CM

## 2019-07-09 DIAGNOSIS — A60.00 GENITAL HERPES SIMPLEX, UNSPECIFIED SITE: ICD-10-CM

## 2019-07-09 DIAGNOSIS — K75.81 NASH (NONALCOHOLIC STEATOHEPATITIS): ICD-10-CM

## 2019-07-09 PROCEDURE — 71046 X-RAY EXAM CHEST 2 VIEWS: CPT

## 2019-07-09 RX ORDER — OMEPRAZOLE 20 MG/1
20 CAPSULE, DELAYED RELEASE ORAL DAILY
Qty: 90 CAP | Refills: 3 | Status: SHIPPED | OUTPATIENT
Start: 2019-07-09

## 2019-07-09 NOTE — PROGRESS NOTES
INTERNISTS OF [facility]:  7/15/2019, MRN: [id number]      Farhan Rubio is a 64 y.o. female and presents to clinic for Cholesterol Problem (patient has had her Pap physical at the [facility] in August 2018, and will contact them to see when she is to return) and Labs (done 7-02-19 )    Subjective: The patient is a 53-year-old female with history of eczema, vitiligo, genital herpes, prediabetes, abnormal Pap smear, and BRAGG. 1. BRAGG: Present for at least a year. Her most recent labs show normal kidney and liver function. No excessive alcohol beverage consumption. She is not regularly exercising or dieting. Her weight is 183lbs. She tested negative for hepatitis C.    2. HLD: Her most recent labs show: Her total cholesterol is 189. Her triglycerides are 170. Her HDL is 39. Her LDL is 127. Her 10-year CVD risk per the ACC/AHA risk assessment is 2.2%. She is not on any cholesterol-lowering medication at this time. 3. H/o Genital Herpes: Taking valacyclovir as needed. Her last flareup: 2018. 4. Prediabetes: This is a new diagnosis. Her most recent labs show that her A1c is 6.     5. H/o Abnormal PAP: Her last Pap: 2018 at the [facility]. It was normal per pt hx.     6. Abdominal Pain: RUQ is the location. She has pain every day for 2 yrs. Pain worsens with po intake. She is taking motrin 200mg daily. She had a HIDA scan but did not have any pain sx with this test. It was unremarkable per her hx. Nothing other than motrin makes her pain better. She is taking an OTC rx for GERD that begins with the letter \"t\" but doesn't know the name.      Patient Active Problem List    Diagnosis Date Noted    Eczema 05/31/2018    Vitiligo 05/31/2018    Facial rash 05/31/2018    BRAGG (nonalcoholic steatohepatitis) 05/31/2018    Genital herpes 05/31/2018       Current Outpatient Medications   Medication Sig Dispense Refill    omeprazole (PRILOSEC) 20 mg capsule Take 1 Cap by mouth daily. 90 Cap 3    polyethylene glycol (MIRALAX) 17 gram packet Take 17 g by mouth daily as needed.  ibuprofen (MOTRIN) 600 mg tablet Take  by mouth every six (6) hours as needed for Pain.  calcium carbonate (TUMS PO) Take  by mouth daily as needed.  triamcinolone acetonide (KENALOG) 0.1 % topical cream Apply  to affected area two (2) times a day. use thin layer 45 g 11    valACYclovir (VALTREX) 500 mg tablet You can take 500mg twice a day for 3 days as needed for each flair up. 60 Tab 2    vitamin E (AQUA GEMS) 400 unit capsule Take 400 Units by mouth daily. No Known Allergies    Past Medical History:   Diagnosis Date    Fatty liver     GERD (gastroesophageal reflux disease)        History reviewed. No pertinent surgical history. Family History   Problem Relation Age of Onset    Hypertension Mother     Heart Disease Mother     Diabetes Father     Stroke Father     Heart Attack Father     Diabetes Sister     Diabetes Brother     Heart Attack Brother        Social History     Tobacco Use    Smoking status: Never Smoker    Smokeless tobacco: Never Used   Substance Use Topics    Alcohol use: No       ROS   Review of Systems   Constitutional: Negative for chills and fever. HENT: Negative for ear pain and sore throat. Eyes: Negative for blurred vision and pain. Respiratory: Negative for cough and shortness of breath. Cardiovascular: Positive for chest pain (see HPI). Gastrointestinal: Positive for abdominal pain. Negative for blood in stool and melena. Genitourinary: Negative for dysuria and hematuria. Musculoskeletal: Positive for joint pain. Negative for myalgias. Skin: Negative for rash. Neurological: Negative for tingling, focal weakness and headaches. Endo/Heme/Allergies: Does not bruise/bleed easily. Psychiatric/Behavioral: Negative for substance abuse.        Objective     Vitals:    07/09/19 1359   BP: 112/76   Pulse: 92   Resp: 16 Temp: 97.9 °F (36.6 °C)   TempSrc: Oral   SpO2: 96%   Weight: 183 lb 6.4 oz (83.2 kg)   Height: 5' 2\" (1.575 m)   PainSc:   0 - No pain       Physical Exam   Constitutional: She is oriented to person, place, and time and well-developed, well-nourished, and in no distress. HENT:   Head: Normocephalic and atraumatic. Right Ear: External ear normal.   Left Ear: External ear normal.   Nose: Nose normal.   Mouth/Throat: Oropharynx is clear and moist. No oropharyngeal exudate. Eyes: Pupils are equal, round, and reactive to light. Conjunctivae and EOM are normal. Right eye exhibits no discharge. Left eye exhibits no discharge. No scleral icterus. Neck: Neck supple. Cardiovascular: Normal rate, regular rhythm, normal heart sounds and intact distal pulses. Exam reveals no gallop and no friction rub. No murmur heard. Pulmonary/Chest: Effort normal and breath sounds normal. No respiratory distress. She has no wheezes. She has no rales. She exhibits tenderness (mildly TTP along anterolateral right rib cage). Abdominal: Soft. Bowel sounds are normal. She exhibits no distension. There is tenderness (mildly TTP along her epigastric and RUQ). There is no rebound and no guarding. Musculoskeletal: She exhibits no edema or tenderness (Bue). Lymphadenopathy:     She has no cervical adenopathy. Neurological: She is alert and oriented to person, place, and time. She exhibits normal muscle tone. Gait normal.   Skin: Skin is warm and dry. No erythema. Psychiatric: Affect normal.   Nursing note and vitals reviewed.       LABS   Data Review:   Lab Results   Component Value Date/Time    WBC 5.6 07/02/2019 12:00 AM    HGB 12.9 07/02/2019 12:00 AM    HCT 39.4 07/02/2019 12:00 AM    PLATELET 602 63/76/6376 12:00 AM     (H) 07/02/2019 12:00 AM       Lab Results   Component Value Date/Time    Sodium 139 07/02/2019 12:00 AM    Potassium 4.8 07/02/2019 12:00 AM    Chloride 102 07/02/2019 12:00 AM    CO2 23 07/02/2019 12:00 AM    Glucose 152 (H) 07/02/2019 12:00 AM    BUN 9 07/02/2019 12:00 AM    Creatinine 0.99 07/02/2019 12:00 AM    BUN/Creatinine ratio 9 07/02/2019 12:00 AM    GFR est AA 73 07/02/2019 12:00 AM    GFR est non-AA 63 07/02/2019 12:00 AM    Calcium 9.7 07/02/2019 12:00 AM       Lab Results   Component Value Date/Time    Cholesterol, total 189 07/02/2019 12:00 AM    HDL Cholesterol 39 (L) 07/02/2019 12:00 AM    LDL, calculated 127 (H) 07/02/2019 12:00 AM    VLDL, calculated 23 07/02/2019 12:00 AM    Triglyceride 117 07/02/2019 12:00 AM       Lab Results   Component Value Date/Time    Hemoglobin A1c 6.0 (H) 07/02/2019 12:00 AM    Hemoglobin A1c, External 5.7 03/29/2018       Assessment/Plan:   1. Obesity and Prediabetes: A1C is up to 6. +BRAGG and HLD. -I encouraged her to reduce her processed food intake. I encouraged her to exercise regularly as tolerated. I will recheck her weight at her follow-up appointment. We will recheck an A1c in 6 to 12 months. 2. Right-sided chest wall pain:   -Checking a chest x-ray. ORDERS:  - XR CHEST AP LAT; Future    3. Right sided abdominal pain  -Ordering an abdominal ultrasound. - We discussed the need for her to see GI if her ultrasound findings are unremarkable. - A trial of Prilosec. ORDERS:  - US ABD COMP; Future  - omeprazole (PRILOSEC) 20 mg capsule; Take 1 Cap by mouth daily. Dispense: 90 Cap; Refill: 3    4. Health Maintenance:  - Requesting her last Pap.    5. H/o Genital Herpes:  - Ok to c/w valtrex prn      Health Maintenance Due   Topic Date Due    PAP AKA CERVICAL CYTOLOGY  01/21/1983    Shingrix Vaccine Age 50> (1 of 2) 01/21/2012    DTaP/Tdap/Td series (2 - Td) 05/23/2018     Lab review: labs are reviewed in the EHR    I have discussed the diagnosis with the patient and the intended plan as seen in the above orders. The patient has received an after-visit summary and questions were answered concerning future plans.   I have discussed medication side effects and warnings with the patient as well. I have reviewed the plan of care with the patient, accepted their input and they are in agreement with the treatment goals. All questions were answered. The patient understands the plan of care. Handouts provided today with above information. Pt instructed if symptoms worsen to call the office or report to the ED for continued care. Greater than 50% of the visit time was spent in counseling and/or coordination of care. Voice recognition was used to generate this report, which may have resulted in some phonetic based errors in grammar and contents. Even though attempts were made to correct all the mistakes, some may have been missed, and remained in the body of the document. Follow-up and Dispositions    · Return in about 1 year (around 7/9/2020).          Alil Erazo MD

## 2019-07-09 NOTE — PROGRESS NOTES
Chief Complaint   Patient presents with    Cholesterol Problem     patient has had her Pap physical at the [facility] in August 2018, and will contact them to see when she is to return    Labs     done 7-02-19        1. Have you been to the ER, urgent care clinic since your last visit? Hospitalized since your last visit? No    2. Have you seen or consulted any other health care providers outside of the [address] [place] [address] since your last visit? Include any pap smears or colon screening. No    Patient was given a copy of the Advanced Directive and understands to bring it in once completed.   Health Maintenance Due   Topic Date Due    PAP AKA CERVICAL CYTOLOGY  01/21/1983    Shingrix Vaccine Age 50> (1 of 2) 01/21/2012    DTaP/Tdap/Td series (2 - Td) 05/23/2018

## 2019-07-09 NOTE — TELEPHONE ENCOUNTER
Chief Complaint   Patient presents with    Medication Refill     per Dr Elio Barrios a Printed Script for Prilosec 20 mg is ready for  at the  or I can call this into a local pharmacy or mail this to you     7-09-19 I left a voice message for the patient to return the call.

## 2019-07-09 NOTE — PATIENT INSTRUCTIONS
Juliana January Health Maintenance Due   Topic Date Due    PAP AKA CERVICAL CYTOLOGY  01/21/1983    Shingrix Vaccine Age 50> (1 of 2) 01/21/2012    DTaP/Tdap/Td series (2 - Td) 05/23/2018          Prediabetes: Care Instructions  Your Care Instructions    Prediabetes is a warning sign that you are at risk for getting type 2 diabetes. It means that your blood sugar is higher than it should be. The food you eat turns into sugar, which your body uses for energy. Normally, an organ called the pancreas makes insulin, which allows the sugar in your blood to get into your body's cells. But when your body can't use insulin the right way, the sugar doesn't move into cells. It stays in your blood instead. This is called insulin resistance. The buildup of sugar in the blood causes prediabetes. The good news is that lifestyle changes may help you get your blood sugar back to normal and help you avoid or delay diabetes. Follow-up care is a key part of your treatment and safety. Be sure to make and go to all appointments, and call your doctor if you are having problems. It's also a good idea to know your test results and keep a list of the medicines you take. How can you care for yourself at home? · Watch your weight. A healthy weight helps your body use insulin properly. · Limit the amount of calories, sweets, and unhealthy fat you eat. Ask your doctor if you should see a dietitian. A registered dietitian can help you create meal plans that fit your lifestyle. · Get at least 30 minutes of exercise on most days of the week. Exercise helps control your blood sugar. It also helps you maintain a healthy weight. Walking is a good choice. You also may want to do other activities, such as running, swimming, cycling, or playing tennis or team sports. · Do not smoke. Smoking can make prediabetes worse. If you need help quitting, talk to your doctor about stop-smoking programs and medicines.  These can increase your chances of quitting for good.  · If your doctor prescribed medicines, take them exactly as prescribed. Call your doctor if you think you are having a problem with your medicine. You will get more details on the specific medicines your doctor prescribes. When should you call for help? Watch closely for changes in your health, and be sure to contact your doctor if:    · You have any symptoms of diabetes. These may include:  ? Being thirsty more often. ? Urinating more. ? Being hungrier. ? Losing weight. ? Being very tired. ? Having blurry vision.     · You have a wound that will not heal.     · You have an infection that will not go away.     · You have problems with your blood pressure.     · You want more information about diabetes and how you can keep from getting it. Where can you learn more? Go to http://yadi-roderick.info/. Enter I222 in the search box to learn more about \"Prediabetes: Care Instructions. \"  Current as of: July 25, 2018  Content Version: 11.9  © 0192-0734 [employer]. Care instructions adapted under license by [employer] (which disclaims liability or warranty for this information). If you have questions about a medical condition or this instruction, always ask your healthcare professional. [address] any warranty or liability for your use of this information.

## 2019-07-10 NOTE — TELEPHONE ENCOUNTER
Chief Complaint   Patient presents with    Medication Refill     per Dr Almanzar Billing a Printed Script for Prilosec 20 mg is ready for  at the  or I can call this into a local pharmacy or mail this to you     7-10-19 I left a voice message informing the patient the printed script is ready for  at a secure area at the .

## 2019-07-11 ENCOUNTER — TELEPHONE (OUTPATIENT)
Dept: INTERNAL MEDICINE CLINIC | Age: 59
End: 2019-07-11

## 2019-07-11 NOTE — TELEPHONE ENCOUNTER
----- Message from Blanca Gupta MD sent at 7/11/2019 11:46 AM EDT -----  Please let her know that her chest x-ray does not show any abnormalities.     Dr. Matilde Cruz  [facility] of [address], [address] [place] [address] Str.  Phone: [phone]  Fax: [phone]

## 2019-07-11 NOTE — PROGRESS NOTES
Please let her know that her chest x-ray does not show any abnormalities.     Dr. Hurd Risk  [facility] of [facility], [facility], [facility] [address]  Phone: [phone]  Fax: [phone]

## 2019-07-15 PROBLEM — Z87.42 HISTORY OF ABNORMAL CERVICAL PAP SMEAR: Status: ACTIVE | Noted: 2019-07-15

## 2019-07-15 PROBLEM — E78.2 MIXED HYPERLIPIDEMIA: Status: ACTIVE | Noted: 2019-07-15

## 2019-07-15 PROBLEM — R21 FACIAL RASH: Status: RESOLVED | Noted: 2018-05-31 | Resolved: 2019-07-15

## 2019-07-17 ENCOUNTER — HOSPITAL ENCOUNTER (OUTPATIENT)
Dept: ULTRASOUND IMAGING | Age: 59
Discharge: HOME | End: 2019-07-17
Attending: INTERNAL MEDICINE
Payer: OTHER GOVERNMENT

## 2019-07-17 DIAGNOSIS — R10.9 RIGHT SIDED ABDOMINAL PAIN: ICD-10-CM

## 2019-07-17 PROCEDURE — 76700 US EXAM ABDOM COMPLETE: CPT

## 2019-07-18 ENCOUNTER — TELEPHONE (OUTPATIENT)
Dept: INTERNAL MEDICINE CLINIC | Age: 59
End: 2019-07-18

## 2019-07-18 DIAGNOSIS — K75.81 NASH (NONALCOHOLIC STEATOHEPATITIS): Primary | ICD-10-CM

## 2019-07-18 DIAGNOSIS — R10.9 ABDOMINAL PAIN, UNSPECIFIED ABDOMINAL LOCATION: ICD-10-CM

## 2019-07-18 NOTE — TELEPHONE ENCOUNTER
----- Message from Anthony Walter MD sent at 7/18/2019 11:33 AM EDT -----  Please let her know that her ultrasound shows evidence of fatty liver disease or BRAGG/nonalcoholic steatohepatitis. Please have her schedule with GI for additional testing (maybe even EGD/Colonosocpy) for persistent abdominal pain symptoms. Please place a referral order to [facility] for evaluation of abdominal pain - for me to sign.     Dr. Gil   [facility] [address]  Phone: [phone]  Fax: [phone]

## 2019-07-18 NOTE — PROGRESS NOTES
Please let her know that her ultrasound shows evidence of fatty liver disease or BRAGG/nonalcoholic steatohepatitis. Please have her schedule with GI for additional testing (maybe even EGD/Colonosocpy) for persistent abdominal pain symptoms. Please place a referral order to [facility] for evaluation of abdominal pain - for me to sign.     Dr. Elsie Newby  [facility], [address] [place] [address], [address] [place] [address] Str.  Phone: [phone]  Fax: [phone]

## 2019-07-19 NOTE — TELEPHONE ENCOUNTER
Chief Complaint   Patient presents with    Results     done 7-17-19 US Abdomin Complete per Dr Jolie Benedict     7-19-19 Patient reached and 2 identifiers were used: Full Name, and Date of Birth verified. The patient was informed, and understands all. The patient also understands that a Gastroenterology Referral has been generated, and to expect a call within the next week to make a new patient appointment to be seen for further evaluation/treatment. The patient has no further questions at this time.

## 2019-07-21 NOTE — TELEPHONE ENCOUNTER
Referral placed.     Dr. Jose Enrique Mccall  [facility], [address] [facility], [facility] [address]  Phone: [phone]  Fax: [phone]

## 2019-07-30 NOTE — TELEPHONE ENCOUNTER
Spoke with Vinnie Chandler, referral for gastro was received correctly, not sure what referral they are talking about from [address].

## 2019-10-15 ENCOUNTER — HOSPITAL ENCOUNTER (OUTPATIENT)
Dept: LAB | Age: 59
Discharge: HOME | End: 2019-10-15

## 2019-10-15 PROCEDURE — 86787 VARICELLA-ZOSTER ANTIBODY: CPT

## 2019-10-15 PROCEDURE — 86706 HEP B SURFACE ANTIBODY: CPT

## 2019-10-15 PROCEDURE — 86765 RUBEOLA ANTIBODY: CPT

## 2019-10-15 PROCEDURE — 86735 MUMPS ANTIBODY: CPT

## 2019-10-15 PROCEDURE — 86762 RUBELLA ANTIBODY: CPT

## 2019-10-16 LAB
HBV SURFACE AB SER QL IA: POSITIVE
HBV SURFACE AB SERPL IA-ACNC: 437.95 MIU/ML
HEP BS AB COMMENT,HBSAC: NORMAL
RUBV IGG SER-IMP: NORMAL

## 2019-10-17 LAB
MEV IGG SER IA-ACNC: 175 AU/ML
MUV IGG SER IA-ACNC: 91.8 AU/ML
VZV IGG SER IA-ACNC: >4000 INDEX

## 2020-03-13 ENCOUNTER — TELEPHONE (OUTPATIENT)
Dept: INTERNAL MEDICINE CLINIC | Age: 60
End: 2020-03-13

## 2020-03-13 DIAGNOSIS — Z12.31 ENCOUNTER FOR SCREENING MAMMOGRAM FOR BREAST CANCER: Primary | ICD-10-CM

## 2020-03-15 NOTE — TELEPHONE ENCOUNTER
Please mail order to patient or fax to the [facility].    Dr. Niko Cooper  [facility], [address] [facility], [address] [place] [address] Str.  Phone: [phone]  Fax: [phone]

## 2020-07-10 ENCOUNTER — VIRTUAL VISIT (OUTPATIENT)
Dept: INTERNAL MEDICINE CLINIC | Age: 60
End: 2020-07-10

## 2020-07-10 DIAGNOSIS — K29.70 GASTRITIS, PRESENCE OF BLEEDING UNSPECIFIED, UNSPECIFIED CHRONICITY, UNSPECIFIED GASTRITIS TYPE: Primary | ICD-10-CM

## 2020-07-10 DIAGNOSIS — E78.2 MIXED HYPERLIPIDEMIA: ICD-10-CM

## 2020-07-10 DIAGNOSIS — K75.81 NASH (NONALCOHOLIC STEATOHEPATITIS): ICD-10-CM

## 2020-07-10 DIAGNOSIS — A60.00 GENITAL HERPES SIMPLEX, UNSPECIFIED SITE: ICD-10-CM

## 2020-07-10 DIAGNOSIS — Z87.42 HISTORY OF ABNORMAL CERVICAL PAP SMEAR: ICD-10-CM

## 2020-07-10 DIAGNOSIS — R73.03 PREDIABETES: ICD-10-CM

## 2020-07-10 NOTE — PROGRESS NOTES
Afua Guerrero is a 64 y.o. female who was seen by synchronous (real-time) audio-phone only technology on 7/10/2020. Assessment & Plan:   1. BRAGG:  - Checking a CMP    2. Prediabetes:  - Low carb diet recommended. - Checking an A1C and CMP    3. HLD:  - Checking a lipid panel and CMP    4. H/o Genital Herpes:   - C/w rx as needed for flare ups. 5. H/o Abnormal PAPs:  - I encouraged her to get her f/u PAP with her GYN team. Referral generated to GYN team at [facility]  - Waiting on her mammogram result     6. GERD: +Gastritis. Likely from NSAIDs.   - C/w PPI  - I encouraged her to schedule a f/u apt with GI given her persistent sx. - Checking a CBC and CMP  - I discouraged her from taking NSAIDS! Lab review: labs are reviewed in the EHR and ordered as mentioned above     I have discussed the diagnosis with the patient and the intended plan as seen in the above orders. I have discussed medication side effects and warnings with the patient as well. I have reviewed the plan of care with the patient, accepted their input and they are in agreement with the treatment goals. All questions were answered. The patient understands the plan of care. Pt instructed if symptoms worsen to call the office or report to the ED for continued care. Greater than 50% of the visit time was spent in counseling and/or coordination of care. I spent 12 min with the pt for this encounter. Voice recognition was used to generate this report, which may have resulted in some phonetic based errors in grammar and contents. Even though attempts were made to correct all the mistakes, some may have been missed, and remained in the body of the document. Subjective: Afua Guerrero was seen for   Chief Complaint   Patient presents with    Follow-up     The patient is a 56-year-old female with history of eczema, vitiligo, genital herpes, prediabetes, abnormal Pap smear, and BRAGG.     1.  BRAGG: No ETOH intake. 2. HLD: Her last LDL was 127. Not on any rx for HLD. 3. H/o Genital Herpes: No flare ups over the past year. 4. H/o Abnormal PAP: Her last PAP was normal and done: 2018. She had her mammogram earlier this year. Results are pending. 5. Prediabetes: Her A1C last year showed an A1C of 6. Diet: a work in progress per pt hx.     6. GERD: Taking Prilosec. She is taking linzess for constipation sx off/on. She continues to have GERD sx along her epigastric area. +EGD. She had gastritis along her EGD. She is taking motrin and tylenol prn. No bleeding/melena. Prior to Admission medications    Medication Sig Start Date End Date Taking? Authorizing Provider   omeprazole (PRILOSEC) 20 mg capsule Take 1 Cap by mouth daily. 7/9/19   Vivian Hinojosa MD   valACYclovir (VALTREX) 500 mg tablet You can take 500mg twice a day for 3 days as needed for each flair up. 7/30/18   Vivian Hinojosa MD   polyethylene glycol (MIRALAX) 17 gram packet Take 17 g by mouth daily as needed. Provider, Historical   ibuprofen (MOTRIN) 600 mg tablet Take  by mouth every six (6) hours as needed for Pain. Provider, Historical   calcium carbonate (TUMS PO) Take  by mouth daily as needed. Provider, Historical   vitamin E (AQUA GEMS) 400 unit capsule Take 400 Units by mouth daily. Provider, Historical   triamcinolone acetonide (KENALOG) 0.1 % topical cream Apply  to affected area two (2) times a day. use thin layer 5/30/18   Vivian Hinojosa MD     No Known Allergies  Past Medical History:   Diagnosis Date    Fatty liver     GERD (gastroesophageal reflux disease)      No past surgical history on file.   Family History   Problem Relation Age of Onset    Hypertension Mother     Heart Disease Mother     Diabetes Father     Stroke Father     Heart Attack Father     Diabetes Sister     Diabetes Brother     Heart Attack Brother      Social History     Socioeconomic History    Marital status: UNKNOWN     Spouse name: Not on file    Number of children: Not on file    Years of education: Not on file    Highest education level: Not on file   Tobacco Use    Smoking status: Never Smoker    Smokeless tobacco: Never Used   Substance and Sexual Activity    Alcohol use: No    Drug use: No    Sexual activity: Yes     Partners: Male       ROS:  Gen: No fever/chills  HEENT: No sore throat, eye pain, ear pain, or congestion. No HA  CV: No CP  Resp: No cough/SOB  GI: No abdominal pain  : No hematuria/dysuria  Derm: No rash  Neuro: No new paresthesias/weakness  Musc: No new myalgias/jt pain  Psych: No depression sx      LABS:  Lab Results   Component Value Date/Time    Sodium 139 07/02/2019 12:00 AM    Potassium 4.8 07/02/2019 12:00 AM    Chloride 102 07/02/2019 12:00 AM    CO2 23 07/02/2019 12:00 AM    Glucose 152 (H) 07/02/2019 12:00 AM    BUN 9 07/02/2019 12:00 AM    Creatinine 0.99 07/02/2019 12:00 AM    BUN/Creatinine ratio 9 07/02/2019 12:00 AM    GFR est AA 73 07/02/2019 12:00 AM    GFR est non-AA 63 07/02/2019 12:00 AM    Calcium 9.7 07/02/2019 12:00 AM       Lab Results   Component Value Date/Time    Cholesterol, total 189 07/02/2019 12:00 AM    HDL Cholesterol 39 (L) 07/02/2019 12:00 AM    LDL, calculated 127 (H) 07/02/2019 12:00 AM    VLDL, calculated 23 07/02/2019 12:00 AM    Triglyceride 117 07/02/2019 12:00 AM       Lab Results   Component Value Date/Time    WBC 5.6 07/02/2019 12:00 AM    HGB 12.9 07/02/2019 12:00 AM    HCT 39.4 07/02/2019 12:00 AM    PLATELET 712 11/64/3167 12:00 AM     (H) 07/02/2019 12:00 AM       Lab Results   Component Value Date/Time    Hemoglobin A1c 6.0 (H) 07/02/2019 12:00 AM    Hemoglobin A1c, External 5.7 03/29/2018       No results found for: TSH, TSH2, TSH3, TSHP, TSHEXT        Due to this being a TeleHealth phone only evaluation, many elements of the physical examination are unable to be assessed.      The pt was seen by synchronous (real-time) audio-phone only technology, and/or her healthcare decision maker, is aware that this patient-initiated, Telehealth encounter is a billable service, with coverage as determined by her insurance carrier. She is aware that she may receive a bill and has provided verbal consent to proceed: Yes. The pt is being evaluated by a phone only visit encounter for concerns as above. A caregiver was present when appropriate. Due to this being a TeleHealth encounter (During GRCLT-91 public health emergency), evaluation of the following organ systems was limited: Vitals/Constitutional/EENT/Resp/CV/GI//MS/Neuro/Skin/Heme-Lymph-Imm. Pursuant to the emergency declaration under the [address] [place] [address] authority and the [employer] Act, this Virtual phone only Visit was conducted, with patient's (and/or legal guardian's) consent, to reduce the patient's risk of exposure to COVID-19 and provide necessary medical care. Services were provided through a phone only synchronous discussion virtually to substitute for in-person clinic visit. Patient and provider were located at their individual homes. We discussed the expected course, resolution and complications of the diagnosis(es) in detail. Medication risks, benefits, costs, interactions, and alternatives were discussed as indicated. I advised her to contact the office if her condition worsens, changes or fails to improve as anticipated. She expressed understanding with the diagnosis(es) and plan.      Bart Longo MD

## 2020-07-16 ENCOUNTER — TELEPHONE (OUTPATIENT)
Dept: INTERNAL MEDICINE CLINIC | Age: 60
End: 2020-07-16

## 2020-07-16 NOTE — TELEPHONE ENCOUNTER
----- Message from Yuri Almodovar MD sent at 7/10/2020 10:44 AM EDT -----  Regarding: F/u apts needed  Please schedule her for labs this month. Please schedule for a follow up 30 min visit in a year.     Thanks,  Dr. Jose Cruz Garrett  [facility], [address] [place] [address], [address] [place] [address] Str.  Phone: [phone]  Fax: [phone]

## 2020-07-24 ENCOUNTER — TELEPHONE (OUTPATIENT)
Dept: INTERNAL MEDICINE CLINIC | Age: 60
End: 2020-07-24

## 2020-07-24 DIAGNOSIS — K29.70 GASTRITIS, PRESENCE OF BLEEDING UNSPECIFIED, UNSPECIFIED CHRONICITY, UNSPECIFIED GASTRITIS TYPE: Primary | ICD-10-CM

## 2020-07-24 NOTE — TELEPHONE ENCOUNTER
Please place a referral to her present GI doctor's office. Diagnosis: Gastritis. I will sign it was the order is in.     Dr. Jose Antonio Quinonez  [facility], [address] [place] [address], [address] [place] [address].  Phone: [phone]  Fax: [phone]

## 2020-07-24 NOTE — TELEPHONE ENCOUNTER
Pt states she was told by Dr Yvonne Pineda to follow-up with her [facility]le Riedel doctor but when she called to make follow-up appt, they told her they need a referral sent.   Please advise pt at [phone]

## 2020-07-28 ENCOUNTER — APPOINTMENT (OUTPATIENT)
Dept: INTERNAL MEDICINE CLINIC | Age: 60
End: 2020-07-28

## 2020-07-28 DIAGNOSIS — K75.81 NASH (NONALCOHOLIC STEATOHEPATITIS): ICD-10-CM

## 2020-07-28 DIAGNOSIS — R73.03 PREDIABETES: ICD-10-CM

## 2020-07-28 DIAGNOSIS — K29.70 GASTRITIS, PRESENCE OF BLEEDING UNSPECIFIED, UNSPECIFIED CHRONICITY, UNSPECIFIED GASTRITIS TYPE: ICD-10-CM

## 2020-07-28 DIAGNOSIS — E78.2 MIXED HYPERLIPIDEMIA: ICD-10-CM

## 2020-07-29 ENCOUNTER — TELEPHONE (OUTPATIENT)
Dept: INTERNAL MEDICINE CLINIC | Age: 60
End: 2020-07-29

## 2020-07-29 LAB
ALBUMIN SERPL-MCNC: 4 G/DL (ref 3.8–4.9)
ALBUMIN/GLOB SERPL: 1.1 {RATIO} (ref 1.2–2.2)
ALP SERPL-CCNC: 72 IU/L (ref 39–117)
ALT SERPL-CCNC: 23 IU/L (ref 0–32)
AST SERPL-CCNC: 27 IU/L (ref 0–40)
BASOPHILS # BLD AUTO: 0 X10E3/UL (ref 0–0.2)
BASOPHILS NFR BLD AUTO: 0 %
BILIRUB SERPL-MCNC: 0.6 MG/DL (ref 0–1.2)
BUN SERPL-MCNC: 9 MG/DL (ref 6–24)
BUN/CREAT SERPL: 10 (ref 9–23)
CALCIUM SERPL-MCNC: 9.7 MG/DL (ref 8.7–10.2)
CHLORIDE SERPL-SCNC: 101 MMOL/L (ref 96–106)
CHOLEST SERPL-MCNC: 207 MG/DL (ref 100–199)
CO2 SERPL-SCNC: 23 MMOL/L (ref 20–29)
CREAT SERPL-MCNC: 0.93 MG/DL (ref 0.57–1)
EOSINOPHIL # BLD AUTO: 0 X10E3/UL (ref 0–0.4)
EOSINOPHIL NFR BLD AUTO: 1 %
ERYTHROCYTE [DISTWIDTH] IN BLOOD BY AUTOMATED COUNT: 11.7 % (ref 11.7–15.4)
GLOBULIN SER CALC-MCNC: 3.6 G/DL (ref 1.5–4.5)
GLUCOSE SERPL-MCNC: 132 MG/DL (ref 65–99)
HBA1C MFR BLD: 6 % (ref 4.8–5.6)
HCT VFR BLD AUTO: 38.6 % (ref 34–46.6)
HDLC SERPL-MCNC: 47 MG/DL
HGB BLD-MCNC: 12.9 G/DL (ref 11.1–15.9)
IMM GRANULOCYTES # BLD AUTO: 0 X10E3/UL (ref 0–0.1)
IMM GRANULOCYTES NFR BLD AUTO: 0 %
INTERPRETATION, 910389: NORMAL
LDLC SERPL CALC-MCNC: 137 MG/DL (ref 0–99)
LYMPHOCYTES # BLD AUTO: 2.7 X10E3/UL (ref 0.7–3.1)
LYMPHOCYTES NFR BLD AUTO: 49 %
MCH RBC QN AUTO: 32.2 PG (ref 26.6–33)
MCHC RBC AUTO-ENTMCNC: 33.4 G/DL (ref 31.5–35.7)
MCV RBC AUTO: 96 FL (ref 79–97)
MONOCYTES # BLD AUTO: 0.3 X10E3/UL (ref 0.1–0.9)
MONOCYTES NFR BLD AUTO: 6 %
NEUTROPHILS # BLD AUTO: 2.4 X10E3/UL (ref 1.4–7)
NEUTROPHILS NFR BLD AUTO: 44 %
PLATELET # BLD AUTO: 223 X10E3/UL (ref 150–450)
POTASSIUM SERPL-SCNC: 4.7 MMOL/L (ref 3.5–5.2)
PROT SERPL-MCNC: 7.6 G/DL (ref 6–8.5)
RBC # BLD AUTO: 4.01 X10E6/UL (ref 3.77–5.28)
SODIUM SERPL-SCNC: 139 MMOL/L (ref 134–144)
TRIGL SERPL-MCNC: 117 MG/DL (ref 0–149)
VLDLC SERPL CALC-MCNC: 23 MG/DL (ref 5–40)
WBC # BLD AUTO: 5.5 X10E3/UL (ref 3.4–10.8)

## 2020-07-29 NOTE — TELEPHONE ENCOUNTER
Unable to reach patient with results, number not working. Will try to reach the patient again at a later time.

## 2020-07-29 NOTE — TELEPHONE ENCOUNTER
----- Message from Ana M Canseco MD sent at 7/29/2020 10:03 AM EDT -----  Please let her know that her A1c is unchanged at 6. Her kidney and liver function labs are unremarkable for significant abnormalities. Her CBC is normal.  Her total cholesterol is up to 207 from 189 a year ago. Her triglycerides are 117. HDL is 47. LDL is up to 137 from 127 a year ago. She needs to reduce her processed food intake and reduce her weight in order to prevent progression of her prediabetes to type 2 diabetes. We will recheck these labs in a year.     Dr. Kem Ascencio  [facility], [address] [place] [address], [facility]  Phone: [phone]  Fax: [phone]

## 2020-08-11 ENCOUNTER — TELEPHONE (OUTPATIENT)
Dept: INTERNAL MEDICINE CLINIC | Age: 60
End: 2020-08-11

## 2020-08-28 ENCOUNTER — VIRTUAL VISIT (OUTPATIENT)
Dept: INTERNAL MEDICINE CLINIC | Age: 60
End: 2020-08-28

## 2020-08-28 DIAGNOSIS — H10.32 ACUTE CONJUNCTIVITIS OF LEFT EYE, UNSPECIFIED ACUTE CONJUNCTIVITIS TYPE: Primary | ICD-10-CM

## 2020-08-28 RX ORDER — POLYMYXIN B SULFATE AND TRIMETHOPRIM 1; 10000 MG/ML; [USP'U]/ML
1 SOLUTION OPHTHALMIC EVERY 6 HOURS
Qty: 10 ML | Refills: 0 | Status: SHIPPED | OUTPATIENT
Start: 2020-08-28 | End: 2020-09-04

## 2020-08-28 NOTE — PROGRESS NOTES
Lukasz Sprague is a 64 y.o. female who was seen by synchronous (real-time) audio phone only technology on 8/28/2020. Assessment & Plan:   Left Eye Conjunctivitis:   Polytrim eyedrops prescribed for presumed left eye conjunctivitis. The patient instructed to notify us if her symptoms worsen. Lab review: labs are reviewed in the EHR     I have discussed the diagnosis with the patient and the intended plan as seen in the above orders. I have discussed medication side effects and warnings with the patient as well. I have reviewed the plan of care with the patient, accepted their input and they are in agreement with the treatment goals. All questions were answered. The patient understands the plan of care. Pt instructed if symptoms worsen to call the office or report to the ED for continued care. Greater than 50% of the visit time was spent in counseling and/or coordination of care. I spent 10 minutes with patient for this phone only encounter. Voice recognition was used to generate this report, which may have resulted in some phonetic based errors in grammar and contents. Even though attempts were made to correct all the mistakes, some may have been missed, and remained in the body of the document. Subjective: Lukasz Sprague was seen for   Chief Complaint   Patient presents with    Eye Pain     The patient is a 22-year-old female with history of eczema, vitiligo, genital herpes, prediabetes, abnormal Pap smear, and BRAGG. Left Eye Conjunctivitis:  Duration of sx: 2 wks. Pain: none. \"It's red and swollen\" -\"but it's slowly going away. \" In the morning, it's puffy. No triggers are known. No alleviating factors are known. Associated sx: pruritus. There is \"crusty\" d/c along her left eye in the morning. No pain. No vision changes. No sick contacts. No HAs or hearing changes. Prior to Admission medications    Medication Sig Start Date End Date Taking?  Authorizing Provider omeprazole (PRILOSEC) 20 mg capsule Take 1 Cap by mouth daily. 7/9/19   Hallie Reyna MD   valACYclovir (VALTREX) 500 mg tablet You can take 500mg twice a day for 3 days as needed for each flair up. 7/30/18   Hallie Reyna MD   polyethylene glycol (MIRALAX) 17 gram packet Take 17 g by mouth daily as needed. Provider, Historical   ibuprofen (MOTRIN) 600 mg tablet Take  by mouth every six (6) hours as needed for Pain. Provider, Historical   calcium carbonate (TUMS PO) Take  by mouth daily as needed. Provider, Historical   vitamin E (AQUA GEMS) 400 unit capsule Take 400 Units by mouth daily. Provider, Historical   triamcinolone acetonide (KENALOG) 0.1 % topical cream Apply  to affected area two (2) times a day. use thin layer 5/30/18   Hallie Reyna MD     No Known Allergies  Past Medical History:   Diagnosis Date    Fatty liver     GERD (gastroesophageal reflux disease)      No past surgical history on file. Family History   Problem Relation Age of Onset    Hypertension Mother     Heart Disease Mother     Diabetes Father     Stroke Father     Heart Attack Father     Diabetes Sister     Diabetes Brother     Heart Attack Brother      Social History     Socioeconomic History    Marital status: UNKNOWN     Spouse name: Not on file    Number of children: Not on file    Years of education: Not on file    Highest education level: Not on file   Tobacco Use    Smoking status: Never Smoker    Smokeless tobacco: Never Used   Substance and Sexual Activity    Alcohol use: No    Drug use: No    Sexual activity: Yes     Partners: Male       ROS:  Gen: No fever/chills  HEENT: No sore throat, eye pain, ear pain, or congestion. No HA. See HPI.    CV: No CP  Resp: No cough/SOB  GI: No abdominal pain  : No hematuria/dysuria  Derm: No rash  Neuro: No new paresthesias/weakness  Musc: No new myalgias/jt pain  Psych: No depression sx      LABS:  Lab Results   Component Value Date/Time    Sodium 139 07/28/2020 08:12 AM    Potassium 4.7 07/28/2020 08:12 AM    Chloride 101 07/28/2020 08:12 AM    CO2 23 07/28/2020 08:12 AM    Glucose 132 (H) 07/28/2020 08:12 AM    BUN 9 07/28/2020 08:12 AM    Creatinine 0.93 07/28/2020 08:12 AM    BUN/Creatinine ratio 10 07/28/2020 08:12 AM    GFR est AA 78 07/28/2020 08:12 AM    GFR est non-AA 68 07/28/2020 08:12 AM    Calcium 9.7 07/28/2020 08:12 AM       Lab Results   Component Value Date/Time    Cholesterol, total 207 (H) 07/28/2020 08:12 AM    HDL Cholesterol 47 07/28/2020 08:12 AM    LDL, calculated 137 (H) 07/28/2020 08:12 AM    VLDL, calculated 23 07/28/2020 08:12 AM    Triglyceride 117 07/28/2020 08:12 AM       Lab Results   Component Value Date/Time    WBC 5.5 07/28/2020 08:12 AM    HGB 12.9 07/28/2020 08:12 AM    HCT 38.6 07/28/2020 08:12 AM    PLATELET 914 60/45/8566 08:12 AM    MCV 96 07/28/2020 08:12 AM       Lab Results   Component Value Date/Time    Hemoglobin A1c 6.0 (H) 07/28/2020 08:12 AM    Hemoglobin A1c, External 5.7 03/29/2018       No results found for: TSH, TSH2, TSH3, TSHP, TSHEXT        Due to this being a TeleHealth phone only evaluation, many elements of the physical examination are unable to be assessed. The pt was seen by synchronous (real-time) audio-phone only technology, and/or her healthcare decision maker, is aware that this patient-initiated, Telehealth encounter is a billable service, with coverage as determined by her insurance carrier. She is aware that she may receive a bill and has provided verbal consent to proceed: Yes. The pt is being evaluated by a phone only visit encounter for concerns as above. A caregiver was present when appropriate. Due to this being a TeleHealth encounter (During MXOPM-69 public health emergency), evaluation of the following organ systems was limited: Vitals/Constitutional/EENT/Resp/CV/GI//MS/Neuro/Skin/Heme-Lymph-Imm.    Pursuant to the emergency declaration under the [address] Emergencies Act, 1135 waiver authority and the Coronavirus Preparedness and Response Supplemental Appropriations Act, this Virtual phone only Visit was conducted, with patient's (and/or legal guardian's) consent, to reduce the patient's risk of exposure to COVID-19 and provide necessary medical care. Services were provided through a phone only synchronous discussion virtually to substitute for in-person clinic visit. Patient and provider were located at their individual homes. We discussed the expected course, resolution and complications of the diagnosis(es) in detail. Medication risks, benefits, costs, interactions, and alternatives were discussed as indicated. I advised her to contact the office if her condition worsens, changes or fails to improve as anticipated. She expressed understanding with the diagnosis(es) and plan.      Leyla Ortega MD

## 2021-01-26 ENCOUNTER — TELEPHONE (OUTPATIENT)
Dept: INTERNAL MEDICINE CLINIC | Age: 61
End: 2021-01-26

## 2021-01-26 NOTE — TELEPHONE ENCOUNTER
Spoke with pt. She stated she had her mammogram done last year at the [facility] and she will fax over her results to [facility].

## 2021-07-13 NOTE — PROGRESS NOTES
Haroldo Quigley presents today for   Chief Complaint   Patient presents with    Follow-up     1 yr f/u    Rash     Patient reports itching rash since recieving the 2nd covid vaccine in feb only when eating certain foods like citrus                Coordination of Care:  1. Have you been to the ER, urgent care clinic since your last visit? Hospitalized since your last visit? NO    2. Have you seen or consulted any other health care providers outside of the [address] [place] [address] since your last visit? Include any pap smears or colon screening. YES      Haroldo Quigley 1960 female who presents for routine immunizations. Patient denies any symptoms , reactions or allergies that would exclude them from being immunized today. Risks and adverse reactions were discussed and the VIS was given to them. All questions were addressed. Order placed for tdap,  per Verbal Order from DR. Sandra Pavon with read back. Patient was observed for 15 min post injection. There were no reactions observed.     Nam Mejia LPN

## 2021-07-14 ENCOUNTER — HOSPITAL ENCOUNTER (OUTPATIENT)
Dept: LAB | Age: 61
Discharge: HOME | End: 2021-07-14
Payer: OTHER GOVERNMENT

## 2021-07-14 ENCOUNTER — OFFICE VISIT (OUTPATIENT)
Dept: INTERNAL MEDICINE CLINIC | Age: 61
End: 2021-07-14
Payer: OTHER GOVERNMENT

## 2021-07-14 VITALS
BODY MASS INDEX: 32.39 KG/M2 | DIASTOLIC BLOOD PRESSURE: 85 MMHG | RESPIRATION RATE: 16 BRPM | WEIGHT: 176 LBS | HEART RATE: 70 BPM | HEIGHT: 62 IN | TEMPERATURE: 97.6 F | OXYGEN SATURATION: 97 % | SYSTOLIC BLOOD PRESSURE: 130 MMHG

## 2021-07-14 DIAGNOSIS — E78.2 MIXED HYPERLIPIDEMIA: ICD-10-CM

## 2021-07-14 DIAGNOSIS — R73.03 PREDIABETES: ICD-10-CM

## 2021-07-14 DIAGNOSIS — Z12.31 ENCOUNTER FOR SCREENING MAMMOGRAM FOR BREAST CANCER: ICD-10-CM

## 2021-07-14 DIAGNOSIS — E78.2 MIXED HYPERLIPIDEMIA: Primary | ICD-10-CM

## 2021-07-14 DIAGNOSIS — K75.81 NASH (NONALCOHOLIC STEATOHEPATITIS): ICD-10-CM

## 2021-07-14 DIAGNOSIS — R21 RASH: ICD-10-CM

## 2021-07-14 DIAGNOSIS — Z23 ENCOUNTER FOR IMMUNIZATION: ICD-10-CM

## 2021-07-14 DIAGNOSIS — Z87.42 HISTORY OF ABNORMAL CERVICAL PAP SMEAR: ICD-10-CM

## 2021-07-14 LAB
ALBUMIN SERPL-MCNC: 4.1 G/DL (ref 3.4–5)
ALBUMIN/GLOB SERPL: 1.1 {RATIO} (ref 0.8–1.7)
ALP SERPL-CCNC: 90 U/L (ref 45–117)
ALT SERPL-CCNC: 40 U/L (ref 13–56)
ANION GAP SERPL CALC-SCNC: 9 MMOL/L (ref 3–18)
AST SERPL-CCNC: 26 U/L (ref 10–38)
BASOPHILS # BLD: 0 K/UL (ref 0–0.1)
BASOPHILS NFR BLD: 0 % (ref 0–2)
BILIRUB SERPL-MCNC: 0.9 MG/DL (ref 0.2–1)
BUN SERPL-MCNC: 11 MG/DL (ref 7–18)
BUN/CREAT SERPL: 12 (ref 12–20)
CALCIUM SERPL-MCNC: 9.1 MG/DL (ref 8.5–10.1)
CHLORIDE SERPL-SCNC: 106 MMOL/L (ref 100–111)
CHOLEST SERPL-MCNC: 209 MG/DL
CO2 SERPL-SCNC: 24 MMOL/L (ref 21–32)
CREAT SERPL-MCNC: 0.9 MG/DL (ref 0.6–1.3)
DIFFERENTIAL METHOD BLD: ABNORMAL
EOSINOPHIL # BLD: 0.1 K/UL (ref 0–0.4)
EOSINOPHIL NFR BLD: 1 % (ref 0–5)
ERYTHROCYTE [DISTWIDTH] IN BLOOD BY AUTOMATED COUNT: 12.1 % (ref 11.6–14.5)
EST. AVERAGE GLUCOSE BLD GHB EST-MCNC: 146 MG/DL
GLOBULIN SER CALC-MCNC: 3.9 G/DL (ref 2–4)
GLUCOSE SERPL-MCNC: 161 MG/DL (ref 74–99)
HBA1C MFR BLD: 6.7 % (ref 4.2–5.6)
HCT VFR BLD AUTO: 38.2 % (ref 35–45)
HDLC SERPL-MCNC: 47 MG/DL (ref 40–60)
HDLC SERPL: 4.4 {RATIO} (ref 0–5)
HGB BLD-MCNC: 12.6 G/DL (ref 12–16)
LDLC SERPL CALC-MCNC: 142.6 MG/DL (ref 0–100)
LIPID PROFILE,FLP: ABNORMAL
LYMPHOCYTES # BLD: 2.1 K/UL (ref 0.9–3.6)
LYMPHOCYTES NFR BLD: 44 % (ref 21–52)
MCH RBC QN AUTO: 31.7 PG (ref 24–34)
MCHC RBC AUTO-ENTMCNC: 33 G/DL (ref 31–37)
MCV RBC AUTO: 96 FL (ref 74–97)
MONOCYTES # BLD: 0.3 K/UL (ref 0.05–1.2)
MONOCYTES NFR BLD: 5 % (ref 3–10)
NEUTS SEG # BLD: 2.4 K/UL (ref 1.8–8)
NEUTS SEG NFR BLD: 49 % (ref 40–73)
PLATELET # BLD AUTO: 181 K/UL (ref 135–420)
PMV BLD AUTO: 11.7 FL (ref 9.2–11.8)
POTASSIUM SERPL-SCNC: 4.3 MMOL/L (ref 3.5–5.5)
PROT SERPL-MCNC: 8 G/DL (ref 6.4–8.2)
RBC # BLD AUTO: 3.98 M/UL (ref 4.2–5.3)
SODIUM SERPL-SCNC: 139 MMOL/L (ref 136–145)
TRIGL SERPL-MCNC: 97 MG/DL (ref ?–150)
VLDLC SERPL CALC-MCNC: 19.4 MG/DL
WBC # BLD AUTO: 4.9 K/UL (ref 4.6–13.2)

## 2021-07-14 PROCEDURE — 36415 COLL VENOUS BLD VENIPUNCTURE: CPT

## 2021-07-14 PROCEDURE — 90715 TDAP VACCINE 7 YRS/> IM: CPT | Performed by: INTERNAL MEDICINE

## 2021-07-14 PROCEDURE — 90471 IMMUNIZATION ADMIN: CPT | Performed by: INTERNAL MEDICINE

## 2021-07-14 PROCEDURE — 83036 HEMOGLOBIN GLYCOSYLATED A1C: CPT

## 2021-07-14 PROCEDURE — 85025 COMPLETE CBC W/AUTO DIFF WBC: CPT

## 2021-07-14 PROCEDURE — 36415 COLL VENOUS BLD VENIPUNCTURE: CPT | Performed by: INTERNAL MEDICINE

## 2021-07-14 PROCEDURE — 80061 LIPID PANEL: CPT

## 2021-07-14 PROCEDURE — 99214 OFFICE O/P EST MOD 30 MIN: CPT | Performed by: INTERNAL MEDICINE

## 2021-07-14 PROCEDURE — 80053 COMPREHEN METABOLIC PANEL: CPT

## 2021-07-14 NOTE — PROGRESS NOTES
[facility]:  7/14/2021, MRN: [id number]      Kaz Chatterjee is a 63 y.o. female and presents to clinic for Follow-up (1 yr f/u) and Rash (Patient reports itching rash since recieving the 2nd covid vaccine in feb only when eating certain foods like citrus)    Subjective: The patient is a 51-year-old female with history of eczema, vitiligo, genital herpes, prediabetes, HLD, abnormal Pap smear, and BRAGG. 1. Eczema: Today she reports: she is asymptomatic. She uses triamcinolone cream prn.      2. Prediabetes: Weight is 176lbs. Her diet is \"a little better. \"      3. BRAGG and HLD: No ETOH intake. She is not on cholesterol-lowering medication. Her last total cholesterol a year ago was 207.    4. Abnormal PAP Hx: Her last PAP: July and it was abnormal. She was told to schedule a f/u PAP in a year. She has to schedule a f/u apt for a PAP this month. 5. Health Maintenance:  - Overdue for a mammogram. No breast pain/masses. - S/p 2 doses of the pfizer vaccine in February  - She is reporting a rash \"all over\" off/on - \"It depends on what I eat. \" She takes benadryl prn. This makes it go away. She is presently asymptomatic. Patient Active Problem List    Diagnosis Date Noted    Prediabetes 07/10/2020    Gastritis 07/10/2020    Mixed hyperlipidemia 07/15/2019    History of abnormal cervical Pap smear 07/15/2019    Eczema 05/31/2018    Vitiligo 05/31/2018    BRAGG (nonalcoholic steatohepatitis) 05/31/2018    Genital herpes 05/31/2018       Current Outpatient Medications   Medication Sig Dispense Refill    linaCLOtide (Linzess) 72 mcg cap capsule Take  by mouth Daily (before breakfast).  omeprazole (PRILOSEC) 20 mg capsule Take 1 Cap by mouth daily. 90 Cap 3    valACYclovir (VALTREX) 500 mg tablet You can take 500mg twice a day for 3 days as needed for each flair up. 60 Tab 2    ibuprofen (MOTRIN) 600 mg tablet Take  by mouth every six (6) hours as needed for Pain.       calcium carbonate (TUMS PO) Take  by mouth daily as needed.  polyethylene glycol (MIRALAX) 17 gram packet Take 17 g by mouth daily as needed. (Patient not taking: Reported on 7/14/2021)      vitamin E (AQUA GEMS) 400 unit capsule Take 400 Units by mouth daily. (Patient not taking: Reported on 7/14/2021)      triamcinolone acetonide (KENALOG) 0.1 % topical cream Apply  to affected area two (2) times a day. use thin layer (Patient not taking: Reported on 7/14/2021) 45 g 11       No Known Allergies    Past Medical History:   Diagnosis Date    Fatty liver     GERD (gastroesophageal reflux disease)        No past surgical history on file. Family History   Problem Relation Age of Onset    Hypertension Mother     Heart Disease Mother     Diabetes Father     Stroke Father     Heart Attack Father     Diabetes Sister     Diabetes Brother     Heart Attack Brother        Social History     Tobacco Use    Smoking status: Never Smoker    Smokeless tobacco: Never Used   Substance Use Topics    Alcohol use: No       ROS   Review of Systems   Constitutional: Negative for chills and fever. HENT: Negative for ear pain and sore throat. Eyes: Negative for blurred vision and pain. Respiratory: Negative for cough and shortness of breath. Cardiovascular: Negative for chest pain. Gastrointestinal: Negative for abdominal pain, blood in stool and melena. Genitourinary: Negative for dysuria and hematuria. Musculoskeletal: Negative for joint pain and myalgias. Skin: Negative for rash (none today). Neurological: Negative for tingling, focal weakness and headaches. Endo/Heme/Allergies: Does not bruise/bleed easily. Psychiatric/Behavioral: Negative for depression and substance abuse.        Objective     Vitals:    07/14/21 0905   Resp: 16   Temp: 97.6 °F (36.4 °C)   TempSrc: Temporal   Weight: 176 lb (79.8 kg)   Height: 5' 2\" (1.575 m)   PainSc:   0 - No pain       Physical Exam  Vitals and nursing note reviewed. HENT:      Head: Normocephalic and atraumatic. Right Ear: External ear normal.      Left Ear: External ear normal.   Eyes:      General: No scleral icterus. Right eye: No discharge. Left eye: No discharge. Conjunctiva/sclera: Conjunctivae normal.   Cardiovascular:      Rate and Rhythm: Normal rate and regular rhythm. Heart sounds: Normal heart sounds. No murmur heard. No friction rub. No gallop. Pulmonary:      Effort: Pulmonary effort is normal. No respiratory distress. Breath sounds: Normal breath sounds. No wheezing or rales. Abdominal:      General: Bowel sounds are normal. There is no distension. Palpations: Abdomen is soft. There is no mass. Tenderness: There is no abdominal tenderness. There is no guarding or rebound. Musculoskeletal:         General: No swelling (BUE) or tenderness (BUE). Cervical back: Neck supple. Lymphadenopathy:      Cervical: No cervical adenopathy. Skin:     General: Skin is warm and dry. Findings: No erythema. Neurological:      Mental Status: She is alert and oriented to person, place, and time. Motor: No abnormal muscle tone. Gait: Gait is intact.  Gait normal.   Psychiatric:         Mood and Affect: Mood and affect normal.         LABS   Data Review:   Lab Results   Component Value Date/Time    WBC 5.5 07/28/2020 08:12 AM    HGB 12.9 07/28/2020 08:12 AM    HCT 38.6 07/28/2020 08:12 AM    PLATELET 263 38/78/0723 08:12 AM    MCV 96 07/28/2020 08:12 AM       Lab Results   Component Value Date/Time    Sodium 139 07/28/2020 08:12 AM    Potassium 4.7 07/28/2020 08:12 AM    Chloride 101 07/28/2020 08:12 AM    CO2 23 07/28/2020 08:12 AM    Glucose 132 (H) 07/28/2020 08:12 AM    BUN 9 07/28/2020 08:12 AM    Creatinine 0.93 07/28/2020 08:12 AM    BUN/Creatinine ratio 10 07/28/2020 08:12 AM    GFR est AA 78 07/28/2020 08:12 AM    GFR est non-AA 68 07/28/2020 08:12 AM    Calcium 9.7 07/28/2020 08:12 AM Lab Results   Component Value Date/Time    Cholesterol, total 207 (H) 07/28/2020 08:12 AM    HDL Cholesterol 47 07/28/2020 08:12 AM    LDL, calculated 137 (H) 07/28/2020 08:12 AM    VLDL, calculated 23 07/28/2020 08:12 AM    Triglyceride 117 07/28/2020 08:12 AM       Lab Results   Component Value Date/Time    Hemoglobin A1c 6.0 (H) 07/28/2020 08:12 AM    Hemoglobin A1c, External 5.7 03/29/2018 12:00 AM       Assessment/Plan:   1. Health Maintenance:  -The tetanus vaccine was administered today. -Mammogram ordered for breast cancer screening.  -I encouraged the patient to take a picture with her phone of the rash she has been having intermittently, so that I can see it. Her physical exam findings today are reassuring. ORDERS:  - TETANUS, DIPHTHERIA TOXOIDS AND ACELLULAR PERTUSSIS VACCINE (TDAP), IN INDIVIDS. >=7, IM  - HI IMMUNIZ ADMIN,1 SINGLE/COMB VAC/TOXOID  - DANISH 3D RIZWAN W MAMMO BI SCREENING INCL CAD; Future    2. HLD and BRAGG (nonalcoholic steatohepatitis)  -Checking labs now and in a year. ORDERS:  - METABOLIC PANEL, COMPREHENSIVE; Future  - HEMOGLOBIN A1C WITH EAG; Future  - LIPID PANEL; Future  - CBC WITH AUTOMATED DIFF; Future    3. Prediabetes  -I encouraged her to reduce her carbohydrate intake.  -We will check labs now in a year. ORDERS:  - METABOLIC PANEL, COMPREHENSIVE; Future  - HEMOGLOBIN A1C WITH EAG; Future    4. History of abnormal cervical Pap smear:   -I encouraged her to schedule an appointment with her gynecologist for a Pap smear. Health Maintenance Due   Topic Date Due    COVID-19 Vaccine (1) Never done    Shingrix Vaccine Age 50> (1 of 2) Never done    DTaP/Tdap/Td series (2 - Td or Tdap) 05/23/2018    Breast Cancer Screen Mammogram  03/09/2020    A1C test (Diabetic or Prediabetic)  07/28/2021     Lab review: labs are reviewed in the EHR and ordered as mentioned above.     I have discussed the diagnosis with the patient and the intended plan as seen in the above orders. The patient has received an after-visit summary and questions were answered concerning future plans. I have discussed medication side effects and warnings with the patient as well. I have reviewed the plan of care with the patient, accepted their input and they are in agreement with the treatment goals. All questions were answered. The patient understands the plan of care. Handouts provided today with above information. Pt instructed if symptoms worsen to call the office or report to the ED for continued care. Greater than 50% of the visit time was spent in counseling and/or coordination of care. Voice recognition was used to generate this report, which may have resulted in some phonetic based errors in grammar and contents. Even though attempts were made to correct all the mistakes, some may have been missed, and remained in the body of the document.           Levi Elkins MD

## 2021-07-14 NOTE — PATIENT INSTRUCTIONS
Vaccine Information Statement    Tdap (Tetanus, Diphtheria, Pertussis) Vaccine: What you need to know     Many Vaccine Information Statements are available in Persian and other languages. See www.immunize.org/vis  Hojas de información sobre vacunas están disponibles en español y en muchos otros idiomas. Visite www.immunize.org/vis    1. Why get vaccinated? Tdap vaccine can prevent tetanus, diphtheria, and pertussis. Diphtheria and pertussis spread from person to person. Tetanus enters the body through cuts or wounds.  TETANUS (T) causes painful stiffening of the muscles. Tetanus can lead to serious health problems, including being unable to open the mouth, having trouble swallowing and breathing, or death.  DIPHTHERIA (D) can lead to difficulty breathing, heart failure, paralysis, or death.  PERTUSSIS (aP), also known as whooping cough, can cause uncontrollable, violent coughing which makes it hard to breathe, eat, or drink. Pertussis can be extremely serious in babies and young children, causing pneumonia, convulsions, brain damage, or death. In teens and adults, it can cause weight loss, loss of bladder control, passing out, and rib fractures from severe coughing. 2. Tdap vaccine     Tdap is only for children 7 years and older, adolescents, and adults. Adolescents should receive a single dose of Tdap, preferably at age 6 or 15 years. Pregnant women should get a dose of Tdap during every pregnancy, to protect the  from pertussis. Infants are most at risk for severe, life-threatening complications from pertussis. Adults who have never received Tdap should get a dose of Tdap. Also, adults should receive a booster dose every 10 years, or earlier in the case of a severe and dirty wound or burn. Booster doses can be either Tdap or Td (a different vaccine that protects against tetanus and diphtheria but not pertussis).      Tdap may be given at the same time as other vaccines. 3. Talk with your health care provider    Tell your vaccine provider if the person getting the vaccine:   Has had an allergic reaction after a previous dose of any vaccine that protects against tetanus, diphtheria, or pertussis, or has any severe, life-threatening allergies.  Has had a coma, decreased level of consciousness, or prolonged seizures within 7 days after a previous dose of any pertussis vaccine (DTP, DTaP, or Tdap).  Has seizures or another nervous system problem.  Has ever had Guillain-Barré Syndrome (also called GBS).  Has had severe pain or swelling after a previous dose of any vaccine that protects against tetanus or diphtheria. In some cases, your health care provider may decide to postpone Tdap vaccination to a future visit. People with minor illnesses, such as a cold, may be vaccinated. People who are moderately or severely ill should usually wait until they recover before getting Tdap vaccine. Your health care provider can give you more information. 4. Risks of a vaccine reaction     Pain, redness, or swelling where the shot was given, mild fever, headache, feeling tired, and nausea, vomiting, diarrhea, or stomachache sometimes happen after Tdap vaccine. People sometimes faint after medical procedures, including vaccination. Tell your provider if you feel dizzy or have vision changes or ringing in the ears. As with any medicine, there is a very remote chance of a vaccine causing a severe allergic reaction, other serious injury, or death. 5. What if there is a serious problem? An allergic reaction could occur after the vaccinated person leaves the clinic. If you see signs of a severe allergic reaction (hives, swelling of the face and throat, difficulty breathing, a fast heartbeat, dizziness, or weakness), call 9-1-1 and get the person to the nearest hospital.    For other signs that concern you, call your health care provider.     Adverse reactions should be reported to the Vaccine Adverse Event Reporting System (VAERS). Your health care provider will usually file this report, or you can do it yourself. Visit the VAERS website at www.vaers. hhs.gov or call 1-470.254.3363. VAERS is only for reporting reactions, and VAERS staff do not give medical advice. 6. The National Vaccine Injury Compensation Program    The [facility] Vaccine Injury Compensation Program (VICP) is a federal program that was created to compensate people who may have been injured by certain vaccines. Visit the VICP website at www.[facility].gov/vaccinecompensation or call 1-614.948.9036 to learn about the program and about filing a claim. There is a time limit to file a claim for compensation. 7. How can I learn more?  Ask your health care provider.  Call your local or state health department.  Contact the Centers for Disease Control and Prevention (CDC):  - Call 4-352.238.4287 (1-800-CDC-INFO) or  - Visit CDCs website at www.cdc.gov/vaccines    Vaccine Information Statement (Interim)  Tdap (Tetanus, Diphtheria, Pertussis) Vaccine  04/01/2020  44 CONNIE Chavis 172BE-38   Department of Health and Human Services  Centers for Disease Control and Prevention    Office Use Only         Learning About Breast Cancer Screening  What is breast cancer screening? Breast cancer occurs when cells that are not normal grow in one or both of your breasts. Screening tests can help find breast cancer early. Cancer is easier to treat when it's found early. Having concerns about breast cancer is common. That's why it's important to talk with your doctor about when to start and how often to get screened for breast cancer. How is breast cancer screening done? Several screening tests can be used to check for breast cancer. Mammograms. These tests check for signs of cancer using X-rays. They can show tumors that are too small for you or your doctor to feel.  During a mammogram, a machine squeezes your breasts to make them flatter and easier to X-ray. At least two pictures are taken of each breast. One is taken from the top and one from the side. 3-D mammograms. These tests are also called digital breast tomosynthesis. Your breast is positioned on a flat plate. A top plate is pressed against your breast to keep it in position. The X-ray arm then moves in an arc above the breast and takes many pictures. A computer uses these X-rays to create a three-dimensional image. Clinical breast exam.   In this exam, your doctor carefully feels your breasts and under your arms to check for lumps or other changes. Who should be screened for breast cancer? Experts agree that mammograms are the best screening test for people at average risk of breast cancer. But they don't all agree on the age at which screening should start. And they don't agree on whether it's better to be screened every year or every two years. Here are some of the recommendations from experts:  · Start by age 36 and have a mammogram each year. · Start at age 39 and have a mammogram each year. · Start at age 48 and have a mammogram every 2 years. When to stop having mammograms is another decision. You and your doctor can decide on the right age to start and stop screening based on your personal preferences and overall health. What is your risk for breast cancer? If you don't already know your risk of breast cancer, you can ask your doctor about it. You can also look it up at www.cancer.gov/bcrisktool/. If your doctor says that you have a high or very high risk, ask about ways to reduce your risk. These could include getting extra screening, taking medicine, or having surgery. If you have a strong family history of breast cancer, ask your doctor about genetic testing. What steps can you take to stay healthy? Some things that increase your risk of breast cancer, such as your age and being female, cannot be controlled.  But you can do some things to stay as healthy as you can. · Learn what your breasts normally look and feel like. If you notice any changes, tell your doctor. · If you drink alcohol, limit how much you drink. Any amount of alcohol may increase your risk for some types of cancer. · If you smoke, quit. When you quit smoking, you lower your chances of getting many types of cancer. You can also do your best to eat well, be active, and stay at a healthy weight. Eating healthy foods and being active every day, as well as staying at a healthy weight, may help prevent cancer. Where can you learn more? Go to http://www.gray.com/  Enter H706 in the search box to learn more about \"Learning About Breast Cancer Screening. \"  Current as of: December 17, 2020               Content Version: 12.8  © 2006-2021 [employer]. Care instructions adapted under license by [employer] (which disclaims liability or warranty for this information). If you have questions about a medical condition or this instruction, always ask your healthcare professional. [place] [address] any warranty or liability for your use of this information.

## 2021-07-15 NOTE — PROGRESS NOTES
Please let her know that her kidney and liver function labs are normal. She had a total cholesterol of 209. Triglycerides are 97. HDL is 47. LDL is 142, up from 137 last year. Her A1c is up to 6.7. This is consistent with a new diagnosis of type 2 diabetes. Her CBC is normal. Please schedule her for a 30-minute visit to discuss her new diagnosis of type 2 diabetes.     Dr. Mauri Reese  [facility] [address], [address] [place] [address] Str.  Phone: [phone]  Fax: [phone]

## 2021-07-16 ENCOUNTER — TELEPHONE (OUTPATIENT)
Dept: INTERNAL MEDICINE CLINIC | Age: 61
End: 2021-07-16

## 2021-07-16 NOTE — TELEPHONE ENCOUNTER
----- Message from Мария Young MD sent at 7/15/2021  4:39 PM EDT -----  Please let her know that her kidney and liver function labs are normal. She had a total cholesterol of 209. Triglycerides are 97. HDL is 47. LDL is 142, up from 137 last year. Her A1c is up to 6.7. This is consistent with a new diagnosis of type 2 diabetes. Her CBC is normal. Please schedule her for a 30-minute visit to discuss her new diagnosis of type 2 diabetes.     Dr. Damián Oseguera  [facility] [address], [address] [place] [address].  Phone: [phone]  Fax: [phone]

## 2021-07-23 NOTE — TELEPHONE ENCOUNTER
Patient contacted, patient identified with two identifiers (Name & ). Patient aware of results per DR. Lynn and verbalizes understanding. Patient scheduled for an appointment.

## 2021-07-28 ENCOUNTER — OFFICE VISIT (OUTPATIENT)
Dept: INTERNAL MEDICINE CLINIC | Age: 61
End: 2021-07-28
Payer: OTHER GOVERNMENT

## 2021-07-28 VITALS
HEIGHT: 62 IN | HEART RATE: 89 BPM | TEMPERATURE: 97.2 F | OXYGEN SATURATION: 96 % | WEIGHT: 174.8 LBS | SYSTOLIC BLOOD PRESSURE: 113 MMHG | DIASTOLIC BLOOD PRESSURE: 82 MMHG | BODY MASS INDEX: 32.17 KG/M2 | RESPIRATION RATE: 16 BRPM

## 2021-07-28 DIAGNOSIS — E78.2 MIXED HYPERLIPIDEMIA: ICD-10-CM

## 2021-07-28 DIAGNOSIS — E11.9 DIABETES MELLITUS TYPE 2, DIET-CONTROLLED (HCC): Primary | ICD-10-CM

## 2021-07-28 PROCEDURE — 99214 OFFICE O/P EST MOD 30 MIN: CPT | Performed by: INTERNAL MEDICINE

## 2021-07-28 NOTE — PROGRESS NOTES
1. Have you been to the ER, urgent care clinic since your last visit? Hospitalized since your last visit? No    2. Have you seen or consulted any other health care providers outside of the [address] [place] [address] since your last visit? Include any pap smears or colon screening.  No      Chief Complaint   Patient presents with    Diabetes     new onset

## 2021-07-28 NOTE — PATIENT INSTRUCTIONS
Learning About Type 2 Diabetes  What is type 2 diabetes? Type 2 diabetes is a condition in which you have too much sugar (glucose) in your blood. Glucose is a type of sugar produced in your body when carbohydrates and other foods are digested. It provides energy to cells throughout the body. Normally, blood sugar levels increase after you eat a meal. When blood sugar rises, cells in the pancreas release insulin, which causes the body to absorb sugar from the blood and lowers the blood sugar level to normal.  When you have type 2 diabetes, sugar stays in the blood rather than entering the body's cells to be used for energy. This results in high blood sugar. It happens when your body can't use insulin the right way. Over time, high blood sugar can harm many parts of the body, such as your eyes, heart, blood vessels, nerves, and kidneys. It can also increase your risk for other health problems (complications). What can you expect with type 2 diabetes? Nelson Zuniga keep hearing about how important it is to keep your blood sugar within a target range. That's because over time, high blood sugar can lead to serious problems. It can:  · Harm your eyes, nerves, and kidneys. · Damage your blood vessels, leading to heart disease and stroke. · Reduce blood flow and cause nerve damage to parts of your body, especially your feet. This can cause slow healing and pain when you walk. · Make your immune system weak and less able to fight infections. When people hear the word \"diabetes,\" they often think of problems like these. But daily care and treatment can help prevent or delay these problems. The goal is to keep your blood sugar in a target range. That's the best way to reduce your chance of having more problems from diabetes. What are the symptoms? Some people who have type 2 diabetes may not have any symptoms early on.  Many people with the disease don't even know they have it at first. But with time, diabetes starts to cause symptoms. You have most symptoms of type 2 diabetes when your blood sugar is either too high or too low. The most common symptoms of high blood sugar include:  · Thirst.  · Needing to urinate often. · Weight loss. · Blurry vision. The symptoms of low blood sugar include:  · Sweating. · Shakiness. · Weakness. · Hunger. · Confusion. You're not likely to get symptoms of low blood sugar unless you take insulin or use certain diabetes medicines that lower blood sugar. How can you help prevent type 2 diabetes? There are things you can do to help prevent type 2 diabetes. Stay at a healthy weight. Exercise regularly, and eat healthy foods. Even small changes can make a difference. If you have prediabetes, the medicine metformin can help prevent type 2 diabetes. How is type 2 diabetes treated? Treatment for type 2 diabetes will change over time to meet your needs. But the focus of your treatment will usually be to keep your blood sugar levels in your target range. This will help prevent problems such as eye, kidney, heart, blood vessel, and nerve disease. Some people may need medicines to help their bodies make insulin or decrease insulin resistance. Some medicines slow down how quickly the body absorbs carbohydrates. Treatment to manage type 2 diabetes includes:  · Making healthy food choices and being active. · Losing weight, if you need to. · Seeing your doctor regularly. · Keeping your blood sugar in your target range. · Taking medicines, if you need them. · Quitting smoking, if you smoke. · Keeping your blood pressure and cholesterol under control. Follow-up care is a key part of your treatment and safety. Be sure to make and go to all appointments, and call your doctor if you are having problems. It's also a good idea to know your test results and keep a list of the medicines you take. Where can you learn more?   Go to http://www.gray.com/  Enter [id number] in the search box to learn more about \"Learning About Type 2 Diabetes. \"  Current as of: August 31, 2020               Content Version: 12.8  © 2006-2021 [employer]. Care instructions adapted under license by [employer] (which disclaims liability or warranty for this information). If you have questions about a medical condition or this instruction, always ask your healthcare professional. [address] any warranty or liability for your use of this information. Nutrition Tips for Diabetes: After Your Visit  Your Care Instructions  A healthy diet is important to manage diabetes. It helps you lose weight (if you need to) and keep it off. It gives you the nutrition and energy your body needs and helps prevent heart disease. But a diet for diabetes does not mean that you have to eat special foods. You can eat what your family eats, including occasional sweets and other favorites. But you do have to pay attention to how often you eat and how much you eat of certain foods. The right plan for you will give you meals that help you keep your blood sugar at healthy levels. Try to eat a variety of foods and to spread carbohydrate throughout the day. Carbohydrate raises blood sugar higher and more quickly than any other nutrient does. Carbohydrate is found in sugar, breads and cereals, fruit, starchy vegetables such as potatoes and corn, and milk and yogurt. You may want to work with a dietitian or diabetes educator to help you plan meals and snacks. A dietitian or diabetes educator also can help you lose weight if that is one of your goals. The following tips can help you enjoy your meals and stay healthy. Follow-up care is a key part of your treatment and safety. Be sure to make and go to all appointments, and call your doctor if you are having problems. Its also a good idea to know your test results and keep a list of the medicines you take.   How can you care for yourself at home? · Learn which foods have carbohydrate and how much carbohydrate to eat. A dietitian or diabetes educator can help you learn to keep track of how much carbohydrate you eat. · Spread carbohydrate throughout the day. Eat some carbohydrate at all meals, but do not eat too much at any one time. · Plan meals to include food from all the food groups. These are the food groups and some example portion sizes:  ¨ Grains: 1 slice of bread (1 ounce), ½ cup of cooked cereal, and 1/3 cup of cooked pasta or rice. These have about 15 grams of carbohydrate in a serving. Choose whole grains such as whole wheat bread or crackers, oatmeal, and brown rice more often than refined grains. ¨ Fruit: 1 small fresh fruit, such as an apple or orange; ½ of a banana; ½ cup of chopped, cooked, or canned fruit; ½ cup of fruit juice; 1 cup of melon or raspberries; and 2 tablespoons of dried fruit. These have about 15 grams of carbohydrate in a serving. ¨ Dairy: 1 cup of nonfat or low-fat milk and 2/3 cup of plain yogurt. These have about 15 grams of carbohydrate in a serving. ¨ Protein foods: Beef, chicken, turkey, fish, eggs, tofu, cheese, cottage cheese, and peanut butter. A serving size of meat is 3 ounces, which is about the size of a deck of cards. Examples of meat substitute serving sizes (equal to 1 ounce of meat) are 1/4 cup of cottage cheese, 1 egg, 1 tablespoon of peanut butter, and ½ cup of tofu. These have very little or no carbohydrate per serving. ¨ Vegetables: Starchy vegetables such as ½ cup of cooked dried beans, peas, potatoes, or corn have about 15 grams of carbohydrate. Nonstarchy vegetables have very little carbohydrate, such as 1 cup of raw leafy vegetables (such as spinach), ½ cup of other vegetables (cooked or chopped), and 3/4 cup of vegetable juice. · Use the plate format to plan meals.  It is a good, quick way to make sure that you have a balanced meal. It also helps you spread carbohydrate throughout the day. You divide your plate by types of foods. Put vegetables on half the plate, meat or meat substitutes on one-quarter of the plate, and a grain or starchy vegetable (such as brown rice or a potato) in the final quarter of the plate. To this you can add a small piece of fruit and 1 cup of milk or yogurt, depending on how much carbohydrate you are supposed to eat at a meal.  · Talk to your dietitian or diabetes educator about ways to add limited amounts of sweets into your meal plan. You can eat these foods now and then, as long as you include the amount of carbohydrate they have in your daily carbohydrate allowance. · If you drink alcohol, limit it to no more than 1 drink a day for women and 2 drinks a day for men. If you are pregnant, no amount of alcohol is known to be safe. · Protein, fat, and fiber do not raise blood sugar as much as carbohydrate does. If you eat a lot of these nutrients in a meal, your blood sugar will rise more slowly than it would otherwise. · Limit saturated fats, such as those from meat and dairy products. Try to replace it with monounsaturated fat, such as olive oil. This is a healthier choice because people who have diabetes are at higher-than-average risk of heart disease. But use a modest amount of olive oil. A tablespoon of olive oil has 14 grams of fat and 120 calories. · Exercise lowers blood sugar. If you take insulin by shots or pump, you can use less than you would if you were not exercising. Keep in mind that timing matters. If you exercise within 1 hour after a meal, your body may need less insulin for that meal than it would if you exercised 3 hours after the meal. Test your blood sugar to find out how exercise affects your need for insulin. · Exercise on most days of the week. Aim for at least 30 minutes. Exercise helps you stay at a healthy weight and helps your body use insulin. Walking is an easy way to get exercise. Gradually increase the amount you walk every day. You also may want to swim, bike, or do other activities. When you eat out  · Learn to estimate the serving sizes of foods that have carbohydrate. If you measure food at home, it will be easier to estimate the amount in a serving of restaurant food. · If the meal you order has too much carbohydrate (such as potatoes, corn, or baked beans), ask to have a low-carbohydrate food instead. Ask for a salad or green vegetables. · If you use insulin, check your blood sugar before and after eating out to help you plan how much to eat in the future. · If you eat more carbohydrate at a meal than you had planned, take a walk or do other exercise. This will help lower your blood sugar. Where can you learn more? Go to [employer]  Enter Q308 in the search box to learn more about \"Nutrition Tips for Diabetes: After Your Visit. \"   © 6819-3639 [employer]. Care instructions adapted under license by [address] [place] [address] (which disclaims liability or warranty for this information). This care instruction is for use with your licensed healthcare professional. If you have questions about a medical condition or this instruction, always ask your healthcare professional. [place] [address] any warranty or liability for your use of this information. Content Version: 99.6.689756; Current as of: June 4, 2014                 Learning About Meal Planning for Diabetes  Why plan your meals? Meal planning can be a key part of managing diabetes. Planning meals and snacks with the right balance of carbohydrate, protein, and fat can help you keep your blood sugar at the target level you set with your doctor. You don't have to eat special foods. You can eat what your family eats, including sweets once in a while. But you do have to pay attention to how often you eat and how much you eat of certain foods. You may want to work with a dietitian or a certified diabetes educator.  He or she can give you tips and meal ideas and can answer your questions about meal planning. This health professional can also help you reach a healthy weight if that is one of your goals. What plan is right for you? Your dietitian or diabetes educator may suggest that you start with the plate format or carbohydrate counting. The plate format  The plate format is a simple way to help you manage how you eat. You plan meals by learning how much space each food should take on a plate. Using the plate format helps you spread carbohydrate throughout the day. It can make it easier to keep your blood sugar level within your target range. It also helps you see if you're eating healthy portion sizes. To use the plate format, you put non-starchy vegetables on half your plate. Add meat or meat substitutes on one-quarter of the plate. Put a grain or starchy vegetable (such as brown rice or a potato) on the final quarter of the plate. You can add a small piece of fruit and some low-fat or fat-free milk or yogurt, depending on your carbohydrate goal for each meal.  Here are some tips for using the plate format:  · Make sure that you are not using an oversized plate. A 9-inch plate is best. Many restaurants use larger plates. · Get used to using the plate format at home. Then you can use it when you eat out. · Write down your questions about using the plate format. Talk to your doctor, a dietitian, or a diabetes educator about your concerns. Carbohydrate counting  With carbohydrate counting, you plan meals based on the amount of carbohydrate in each food. Carbohydrate raises blood sugar higher and more quickly than any other nutrient. It is found in desserts, breads and cereals, and fruit. It's also found in starchy vegetables such as potatoes and corn, grains such as rice and pasta, and milk and yogurt. Spreading carbohydrate throughout the day helps keep your blood sugar levels within your target range.   Your daily amount depends on several things, including your weight, how active you are, which diabetes medicines you take, and what your goals are for your blood sugar levels. A registered dietitian or diabetes educator can help you plan how much carbohydrate to include in each meal and snack. A guideline for your daily amount of carbohydrate is:  · 45 to 60 grams at each meal. That's about the same as 3 to 4 carbohydrate servings. · 15 to 20 grams at each snack. That's about the same as 1 carbohydrate serving. The Nutrition Facts label on packaged foods tells you how much carbohydrate is in a serving of the food. First, look at the serving size on the food label. Is that the amount you eat in a serving? All of the nutrition information on a food label is based on that serving size. So if you eat more or less than that, you'll need to adjust the other numbers. Total carbohydrate is the next thing you need to look for on the label. If you count carbohydrate servings, one serving of carbohydrate is 15 grams. For foods that don't come with labels, such as fresh fruits and vegetables, you'll need a guide that lists carbohydrate in these foods. Ask your doctor, dietitian, or diabetes educator about books or other nutrition guides you can use. If you take insulin, you need to know how many grams of carbohydrate are in a meal. This lets you know how much rapid-acting insulin to take before you eat. If you use an insulin pump, you get a constant rate of insulin during the day. So the pump must be programmed at meals to give you extra insulin to cover the rise in blood sugar after meals. When you know how much carbohydrate you will eat, you can take the right amount of insulin. Or, if you always use the same amount of insulin, you need to make sure that you eat the same amount of carbohydrate at meals. If you need more help to understand carbohydrate counting and food labels, ask your doctor, dietitian, or diabetes educator.   How can you plan healthy meals? Here are some tips to get started:  · Plan your meals a week at a time. Don't forget to include snacks too. · Use cookbooks or online recipes to plan several main meals. Plan some quick meals for busy nights. You also can double some recipes that freeze well. Then you can save half for other busy nights when you don't have time to cook. · Make sure you have the ingredients you need for your recipes. If you're running low on basic items, put these items on your shopping list too. · List foods that you use to make breakfasts, lunches, and snacks. List plenty of fruits and vegetables. · Post this list on the refrigerator. Add to it as you think of more things you need. · Take the list to the store to do your weekly shopping. Follow-up care is a key part of your treatment and safety. Be sure to make and go to all appointments, and call your doctor if you are having problems. It's also a good idea to know your test results and keep a list of the medicines you take. Where can you learn more? Go to http://www.gray.com/  Enter N397 in the search box to learn more about \"Learning About Meal Planning for Diabetes. \"  Current as of: August 31, 2020               Content Version: 12.8  © 6584-1905 Healthwise[employer]. Care instructions adapted under license by [email or web address] (which disclaims liability or warranty for this information). If you have questions about a medical condition or this instruction, always ask your healthcare professional. [address] any warranty or liability for your use of this information. Learning About Tests When You Have Diabetes  Why do you need regular tests? Diabetes can lead to other health problems if it's not well controlled. You'll need tests to monitor how well your diabetes is controlled and to check for other things like high cholesterol or kidney problems.  Having tests on a regular schedule can help your doctor find problems early, when it's easier to manage them. What tests do you need? These are the tests you may need and how often you should have them. A1c blood test.   This test shows the average level of blood sugar over the past 2 to 3 months. It helps your doctor see whether blood sugar levels have been staying within your target range. How often: Every 3 to 6 months  Goal: A blood sugar level in your target range  Blood pressure test.   This test measures the pressure of blood flow in the arteries. Controlling blood pressure can help prevent damage to nerves and blood vessels. How often: Every 3 to 6 months  Goal: A blood pressure level in your target range  Cholesterol test.   This test measures the amount of a type of fat in the blood. It is common for people with diabetes to also have high cholesterol. Too much cholesterol in the blood can build up inside the blood vessels and raise the risk for heart attack and stroke. How often: At the time of your diabetes diagnosis, and as often as your doctor recommends after that  Goal: A cholesterol level in your target range  Albumin-creatinine ratio test.   This test checks for kidney damage by looking for the protein albumin (say \"al-BYOO-erendira\") in the urine. Albumin is normally found in the blood. Kidney damage can let small amounts of it (microalbumin) leak into the urine. How often: Once a year  Goal: No protein in the urine  Blood creatinine test/estimated glomerular filtration (eGFR). The blood creatinine (say \"davx-OJ-sd-neen\") level shows how well your kidneys are working. Creatinine is a waste product that muscles release into the blood. Blood creatinine is used to estimate the glomerular filtration rate. A high level of creatinine and/or a low eGFR may mean your kidneys are not working as well as they should. How often: Once a year  Goal: Normal level of creatinine in the blood.  The eGFR goal is greater than 60 mL/min/1.73 m².  Complete foot exam.   The doctor checks for foot sores and whether any sensation has been lost.  How often: Once a year  Goal: Healthy feet with no foot ulcers or loss of feeling  Dental exam and cleaning. The dentist checks for gum disease and tooth decay. People with high blood sugar are more likely to have these problems. How often: Every 6 months  Goal: Healthy teeth and gums  Complete eye exam.   High blood sugar levels can damage the eyes. This exam is done by an ophthalmologist or optometrist. It includes a dilated eye exam. The exam shows whether there's damage to the back of the eye (diabetic retinopathy). How often: Once a year. If you don't have any signs of diabetic retinopathy, your doctor may recommend an exam every 2 years. Goal: No damage to the back of the eye  Thyroid-stimulating hormone (TSH) blood test.   This test checks for thyroid disease. Too little thyroid hormone can cause some medicines (like insulin) to stay in the body longer. This can cause low blood sugar. You may be tested if you have high cholesterol or are a woman over 48years old. How often: As part of your diabetes diagnosis, and as often as your doctor recommends after that  Goal: Normal level of TSH in the blood  Follow-up care is a key part of your treatment and safety. Be sure to make and go to all appointments, and call your doctor if you are having problems. It's also a good idea to know your test results and keep a list of the medicines you take. Where can you learn more? Go to http://www.gray.com/  Enter A243 in the search box to learn more about \"Learning About Tests When You Have Diabetes. \"  Current as of: August 31, 2020               Content Version: 12.8  © 5847-9477 Healthwise[employer]. Care instructions adapted under license by [employer] (which disclaims liability or warranty for this information).  If you have questions about a medical condition or this instruction, always ask your healthcare professional. [place] [address] any warranty or liability for your use of this information.

## 2021-07-28 NOTE — PROGRESS NOTES
INTERNISTS [facility]:  7/28/2021, MRN: [id number]      Yury Preston is a 63 y.o. female and presents to clinic for Diabetes (new onset)      Subjective: The patient is a 51-year-old female with history of eczema, vitiligo, genital herpes, type 2 diabetes, HLD, abnormal Pap smear, and BRAGG. 1. Type 2 DM: New diagnosis. Her A1C was 6.7 per her recent labs. 2.  Hyperlipidemia: Recent labs show that her LDL is up from 1 40-1 37 last triglycerides 97. HDL is 47. Her total cholesterol is 219. She is not on cholesterol-lowering medication. Patient Active Problem List    Diagnosis Date Noted    Prediabetes 07/10/2020    Gastritis 07/10/2020    Mixed hyperlipidemia 07/15/2019    History of abnormal cervical Pap smear 07/15/2019    Eczema 05/31/2018    Vitiligo 05/31/2018    BRAGG (nonalcoholic steatohepatitis) 05/31/2018    Genital herpes 05/31/2018       Current Outpatient Medications   Medication Sig Dispense Refill    linaCLOtide (Linzess) 72 mcg cap capsule Take 72 mcg by mouth Daily (before breakfast).  omeprazole (PRILOSEC) 20 mg capsule Take 1 Cap by mouth daily. 90 Cap 3    valACYclovir (VALTREX) 500 mg tablet You can take 500mg twice a day for 3 days as needed for each flair up. 60 Tab 2    ibuprofen (MOTRIN) 600 mg tablet Take  by mouth every six (6) hours as needed for Pain.  calcium carbonate (TUMS PO) Take  by mouth daily as needed.  triamcinolone acetonide (KENALOG) 0.1 % topical cream Apply  to affected area two (2) times a day. use thin layer (Patient not taking: Reported on 7/14/2021) 45 g 11       No Known Allergies    Past Medical History:   Diagnosis Date    Fatty liver     GERD (gastroesophageal reflux disease)        No past surgical history on file.     Family History   Problem Relation Age of Onset    Hypertension Mother     Heart Disease Mother    Theresa Malka Diabetes Father     Stroke Father     Heart Attack Father     Diabetes Sister     Diabetes Brother     Heart Attack Brother        Social History     Tobacco Use    Smoking status: Never Smoker    Smokeless tobacco: Never Used   Substance Use Topics    Alcohol use: No       ROS   Review of Systems   Constitutional: Negative for chills and fever. HENT: Negative for ear pain and sore throat. Eyes: Negative for blurred vision and pain. Respiratory: Negative for cough and shortness of breath. Cardiovascular: Negative for chest pain. Gastrointestinal: Negative for abdominal pain, blood in stool and melena. Genitourinary: Negative for dysuria and hematuria. Musculoskeletal: Negative for joint pain and myalgias. Skin: Negative for rash. Neurological: Negative for tingling, focal weakness and headaches. Endo/Heme/Allergies: Does not bruise/bleed easily. Psychiatric/Behavioral: Negative for substance abuse. Objective     Vitals:    07/28/21 1226   BP: 113/82   Pulse: 89   Resp: 16   Temp: 97.2 °F (36.2 °C)   TempSrc: Temporal   SpO2: 96%   Weight: 174 lb 12.8 oz (79.3 kg)   Height: 5' 2\" (1.575 m)   PainSc:   0 - No pain       Physical Exam  Vitals and nursing note reviewed. HENT:      Head: Normocephalic and atraumatic. Right Ear: External ear normal.      Left Ear: External ear normal.   Eyes:      General: No scleral icterus. Right eye: No discharge. Left eye: No discharge. Conjunctiva/sclera: Conjunctivae normal.   Cardiovascular:      Rate and Rhythm: Normal rate and regular rhythm. Heart sounds: Normal heart sounds. No murmur heard. No friction rub. No gallop. Pulmonary:      Effort: Pulmonary effort is normal. No respiratory distress. Breath sounds: Normal breath sounds. No wheezing or rales. Abdominal:      General: Bowel sounds are normal. There is no distension. Palpations: Abdomen is soft. There is no mass. Tenderness: There is no abdominal tenderness. There is no guarding or rebound.    Musculoskeletal: General: No swelling or tenderness (BUE). Cervical back: Neck supple. Lymphadenopathy:      Cervical: No cervical adenopathy. Skin:     General: Skin is warm and dry. Findings: No erythema. Neurological:      Mental Status: She is alert and oriented to person, place, and time. Motor: No abnormal muscle tone. Gait: Gait is intact. Gait normal.   Psychiatric:         Mood and Affect: Mood and affect normal.         LABS   Data Review:   Lab Results   Component Value Date/Time    WBC 4.9 07/14/2021 09:42 AM    HGB 12.6 07/14/2021 09:42 AM    HCT 38.2 07/14/2021 09:42 AM    PLATELET 855 70/59/3581 09:42 AM    MCV 96.0 07/14/2021 09:42 AM       Lab Results   Component Value Date/Time    Sodium 139 07/14/2021 09:42 AM    Potassium 4.3 07/14/2021 09:42 AM    Chloride 106 07/14/2021 09:42 AM    CO2 24 07/14/2021 09:42 AM    Anion gap 9 07/14/2021 09:42 AM    Glucose 161 (H) 07/14/2021 09:42 AM    BUN 11 07/14/2021 09:42 AM    Creatinine 0.90 07/14/2021 09:42 AM    BUN/Creatinine ratio 12 07/14/2021 09:42 AM    GFR est AA >60 07/14/2021 09:42 AM    GFR est non-AA >60 07/14/2021 09:42 AM    Calcium 9.1 07/14/2021 09:42 AM       Lab Results   Component Value Date/Time    Cholesterol, total 209 (H) 07/14/2021 09:42 AM    HDL Cholesterol 47 07/14/2021 09:42 AM    LDL, calculated 142.6 (H) 07/14/2021 09:42 AM    VLDL, calculated 19.4 07/14/2021 09:42 AM    Triglyceride 97 07/14/2021 09:42 AM    CHOL/HDL Ratio 4.4 07/14/2021 09:42 AM       Lab Results   Component Value Date/Time    Hemoglobin A1c 6.7 (H) 07/14/2021 09:42 AM    Hemoglobin A1c, External 5.7 03/29/2018 12:00 AM       Assessment/Plan:   1. Mixed hyperlipidemia:   -She declines medication.  -I will check a lipid panel in 3-6 months.  -We discussed the need to adhere to a vegan low-carb diet for best results. ORDERS:  - LIPID PANEL; Future    2.  Diabetes mellitus type 2, diet-controlled:   -She declined medication at this time.  -Checking an A1c in 3 to 6 months. I encouraged her to count her carbs. -I encouraged her to maintain a low-carb diet.  -We discussed the complications associated with poorly controlled type 2 diabetes. All questions were answered. ORDERS:  - HEMOGLOBIN A1C WITH EAG; Future        Health Maintenance Due   Topic Date Due    Foot Exam Q1  Never done    MICROALBUMIN Q1  Never done    Eye Exam Retinal or Dilated  Never done    Shingrix Vaccine Age 50> (2 of 2) 12/10/2018    Breast Cancer Screen Mammogram  03/09/2020         Lab review: labs are reviewed in the EHR and ordered as mentioned above. I have discussed the diagnosis with the patient and the intended plan as seen in the above orders. The patient has received an after-visit summary and questions were answered concerning future plans. I have discussed medication side effects and warnings with the patient as well. I have reviewed the plan of care with the patient, accepted their input and they are in agreement with the treatment goals. All questions were answered. The patient understands the plan of care. Handouts provided today with above information. Pt instructed if symptoms worsen to call the office or report to the ED for continued care. Greater than 50% of the visit time was spent in counseling and/or coordination of care. I spent 35 minutes with patient for this encounter, counseling her as described above. Voice recognition was used to generate this report, which may have resulted in some phonetic based errors in grammar and contents. Even though attempts were made to correct all the mistakes, some may have been missed, and remained in the body of the document.           Malen Hodgkins, MD

## 2021-10-26 ENCOUNTER — APPOINTMENT (OUTPATIENT)
Dept: INTERNAL MEDICINE CLINIC | Age: 61
End: 2021-10-26

## 2021-10-26 ENCOUNTER — HOSPITAL ENCOUNTER (OUTPATIENT)
Dept: LAB | Age: 61
Discharge: HOME | End: 2021-10-26
Payer: OTHER GOVERNMENT

## 2021-10-26 DIAGNOSIS — E11.9 DIABETES MELLITUS TYPE 2, DIET-CONTROLLED (HCC): ICD-10-CM

## 2021-10-26 DIAGNOSIS — E78.2 MIXED HYPERLIPIDEMIA: ICD-10-CM

## 2021-10-26 LAB
CHOLEST SERPL-MCNC: 199 MG/DL
CREAT UR-MCNC: 336 MG/DL (ref 30–125)
HDLC SERPL-MCNC: 46 MG/DL (ref 40–60)
HDLC SERPL: 4.3 {RATIO} (ref 0–5)
LDLC SERPL CALC-MCNC: 131.6 MG/DL (ref 0–100)
LIPID PROFILE,FLP: ABNORMAL
MICROALBUMIN UR-MCNC: 1.97 MG/DL (ref 0–3)
MICROALBUMIN/CREAT UR-RTO: 6 MG/G (ref 0–30)
TRIGL SERPL-MCNC: 107 MG/DL (ref ?–150)
VLDLC SERPL CALC-MCNC: 21.4 MG/DL

## 2021-10-26 PROCEDURE — 80061 LIPID PANEL: CPT

## 2021-10-26 PROCEDURE — 82570 ASSAY OF URINE CREATININE: CPT

## 2021-10-28 ENCOUNTER — LAB ONLY (OUTPATIENT)
Dept: INTERNAL MEDICINE CLINIC | Age: 61
End: 2021-10-28

## 2021-10-28 ENCOUNTER — HOSPITAL ENCOUNTER (OUTPATIENT)
Dept: LAB | Age: 61
Discharge: HOME | End: 2021-10-28
Payer: OTHER GOVERNMENT

## 2021-10-28 DIAGNOSIS — R73.03 PREDIABETES: ICD-10-CM

## 2021-10-28 DIAGNOSIS — R73.03 PREDIABETES: Primary | ICD-10-CM

## 2021-10-28 LAB — HBA1C MFR BLD: 6.8 % (ref 4.2–5.6)

## 2021-10-28 PROCEDURE — 83036 HEMOGLOBIN GLYCOSYLATED A1C: CPT

## 2021-10-28 PROCEDURE — 36415 COLL VENOUS BLD VENIPUNCTURE: CPT

## 2021-11-02 NOTE — PROGRESS NOTES
Zach Verduzco presents today for   Chief Complaint   Patient presents with    Follow-up    Labs     10-28-21                1. \"Have you been to the ER, urgent care clinic since your last visit? Hospitalized since your last visit? \" {no    2. \"Have you seen or consulted any other health care providers outside of the [address] [place] [address] since your last visit? \" yes     3. For patients aged 39-70: Has the patient had a colonoscopy? Yes    Zach Verduzco 1960 female who presents for routine immunizations. Patient denies any symptoms , reactions or allergies that would exclude them from being immunized today. Risks and adverse reactions were discussed and the VIS was given to them. All questions were addressed. Order placed for flu vaccine,  per Verbal Order from  with read back. Patient was observed for 15 min post injection. There were no reactions observed. Aide Ballard LPN      If the patient is female:    4. For patients aged 41-77: Has the patient had a mammogram within the past 2 years? yes    5. For patients aged 21-65: Has the patient had a pap smear?  no

## 2021-11-03 ENCOUNTER — OFFICE VISIT (OUTPATIENT)
Dept: INTERNAL MEDICINE CLINIC | Age: 61
End: 2021-11-03
Payer: OTHER GOVERNMENT

## 2021-11-03 VITALS
HEART RATE: 72 BPM | DIASTOLIC BLOOD PRESSURE: 85 MMHG | RESPIRATION RATE: 16 BRPM | BODY MASS INDEX: 33.13 KG/M2 | WEIGHT: 180 LBS | TEMPERATURE: 97.8 F | OXYGEN SATURATION: 99 % | HEIGHT: 62 IN | SYSTOLIC BLOOD PRESSURE: 134 MMHG

## 2021-11-03 DIAGNOSIS — Z23 NEEDS FLU SHOT: ICD-10-CM

## 2021-11-03 DIAGNOSIS — E78.2 MIXED HYPERLIPIDEMIA: ICD-10-CM

## 2021-11-03 DIAGNOSIS — E11.9 DIABETES MELLITUS TYPE 2, DIET-CONTROLLED (HCC): Primary | ICD-10-CM

## 2021-11-03 PROCEDURE — 90686 IIV4 VACC NO PRSV 0.5 ML IM: CPT | Performed by: INTERNAL MEDICINE

## 2021-11-03 PROCEDURE — 99213 OFFICE O/P EST LOW 20 MIN: CPT | Performed by: INTERNAL MEDICINE

## 2021-11-03 PROCEDURE — 90471 IMMUNIZATION ADMIN: CPT | Performed by: INTERNAL MEDICINE

## 2021-11-03 NOTE — PATIENT INSTRUCTIONS
Vaccine Information Statement    Influenza (Flu) Vaccine (Inactivated or Recombinant): What You Need to Know    Many vaccine information statements are available in Korean and other languages. See www.immunize.org/vis. Hojas de información sobre vacunas están disponibles en español y en muchos otros idiomas. Visite www.immunize.org/vis. 1. Why get vaccinated? Influenza vaccine can prevent influenza (flu). Flu is a contagious disease that spreads around the [facility] every year, usually between October and May. Anyone can get the flu, but it is more dangerous for some people. Infants and young children, people 72 years and older, pregnant people, and people with certain health conditions or a weakened immune system are at greatest risk of flu complications. Pneumonia, bronchitis, sinus infections, and ear infections are examples of flu-related complications. If you have a medical condition, such as heart disease, cancer, or diabetes, flu can make it worse. Flu can cause fever and chills, sore throat, muscle aches, fatigue, cough, headache, and runny or stuffy nose. Some people may have vomiting and diarrhea, though this is more common in children than adults. In an average year, thousands of people in the [facility] die from flu, and many more are hospitalized. Flu vaccine prevents millions of illnesses and flu-related visits to the doctor each year. 2. Influenza vaccines     CDC recommends everyone 6 months and older get vaccinated every flu season. Children 6 months through 6years of age may need 2 doses during a single flu season. Everyone else needs only 1 dose each flu season. It takes about 2 weeks for protection to develop after vaccination. There are many flu viruses, and they are always changing. Each year a new flu vaccine is made to protect against the influenza viruses believed to be likely to cause disease in the upcoming flu season.  Even when the vaccine doesnt exactly match these viruses, it may still provide some protection. Influenza vaccine does not cause flu. Influenza vaccine may be given at the same time as other vaccines. 3. Talk with your health care provider    Tell your vaccination provider if the person getting the vaccine:   Has had an allergic reaction after a previous dose of influenza vaccine, or has any severe, life-threatening allergies    Has ever had Guillain-Barré Syndrome (also called GBS)    In some cases, your health care provider may decide to postpone influenza vaccination until a future visit. Influenza vaccine can be administered at any time during pregnancy. People who are or will be pregnant during influenza season should receive inactivated influenza vaccine. People with minor illnesses, such as a cold, may be vaccinated. People who are moderately or severely ill should usually wait until they recover before getting influenza vaccine. Your health care provider can give you more information. 4. Risks of a vaccine reaction     Soreness, redness, and swelling where the shot is given, fever, muscle aches, and headache can happen after influenza vaccination.  There may be a very small increased risk of Guillain-Barré Syndrome (GBS) after inactivated influenza vaccine (the flu shot). Vika Guzman children who get the flu shot along with pneumococcal vaccine (PCV13) and/or DTaP vaccine at the same time might be slightly more likely to have a seizure caused by fever. Tell your health care provider if a child who is getting flu vaccine has ever had a seizure. People sometimes faint after medical procedures, including vaccination. Tell your provider if you feel dizzy or have vision changes or ringing in the ears. As with any medicine, there is a very remote chance of a vaccine causing a severe allergic reaction, other serious injury, or death. 5. What if there is a serious problem?     An allergic reaction could occur after the vaccinated person leaves the clinic. If you see signs of a severe allergic reaction (hives, swelling of the face and throat, difficulty breathing, a fast heartbeat, dizziness, or weakness), call 9-1-1 and get the person to the nearest hospital.    For other signs that concern you, call your health care provider. Adverse reactions should be reported to the Vaccine Adverse Event Reporting System (VAERS). Your health care provider will usually file this report, or you can do it yourself. Visit the VAERS website at www.vaers. [facility].gov or call 6-363.711.1030. VAERS is only for reporting reactions, and VAERS staff members do not give medical advice. 6. The National Vaccine Injury Compensation Program    The [facility] Vaccine Injury Compensation Program (VICP) is a federal program that was created to compensate people who may have been injured by certain vaccines. Claims regarding alleged injury or death due to vaccination have a time limit for filing, which may be as short as two years. Visit the VICP website at www.[facility].gov/vaccinecompensation or call 4-962.928.3259 to learn about the program and about filing a claim. 7. How can I learn more?  Ask your health care provider.  Call your local or state health department.  Visit the website of the Food and Drug Administration (FDA) for vaccine package inserts and additional information at www.fda.gov/vaccines-blood-biologics/vaccines.  Contact the Centers for Disease Control and Prevention (CDC):  - Call 8-977.415.6901 (1-800-CDC-INFO) or  - Visit CDCs influenza website at www.cdc.gov/flu. Vaccine Information Statement   Inactivated Influenza Vaccine   8/6/2021  44 CONNIE Dhaliwal [id number]   Department of Health and Human Services  Centers for Disease Control and Prevention    Office Use Only         Learning About Meal Planning for Diabetes  Why plan your meals? Meal planning can be a key part of managing diabetes.  Planning meals and snacks with the right balance of carbohydrate, protein, and fat can help you keep your blood sugar at the target level you set with your doctor. You don't have to eat special foods. You can eat what your family eats, including sweets once in a while. But you do have to pay attention to how often you eat and how much you eat of certain foods. You may want to work with a dietitian or a certified diabetes educator. He or she can give you tips and meal ideas and can answer your questions about meal planning. This health professional can also help you reach a healthy weight if that is one of your goals. What plan is right for you? Your dietitian or diabetes educator may suggest that you start with the plate format or carbohydrate counting. The plate format  The plate format is a simple way to help you manage how you eat. You plan meals by learning how much space each food should take on a plate. Using the plate format helps you spread carbohydrate throughout the day. It can make it easier to keep your blood sugar level within your target range. It also helps you see if you're eating healthy portion sizes. To use the plate format, you put non-starchy vegetables on half your plate. Add meat or meat substitutes on one-quarter of the plate. Put a grain or starchy vegetable (such as brown rice or a potato) on the final quarter of the plate. You can add a small piece of fruit and some low-fat or fat-free milk or yogurt, depending on your carbohydrate goal for each meal.  Here are some tips for using the plate format:  · Make sure that you are not using an oversized plate. A 9-inch plate is best. Many restaurants use larger plates. · Get used to using the plate format at home. Then you can use it when you eat out. · Write down your questions about using the plate format. Talk to your doctor, a dietitian, or a diabetes educator about your concerns.   Carbohydrate counting  With carbohydrate counting, you plan meals based on the amount of carbohydrate in each food. Carbohydrate raises blood sugar higher and more quickly than any other nutrient. It is found in desserts, breads and cereals, and fruit. It's also found in starchy vegetables such as potatoes and corn, grains such as rice and pasta, and milk and yogurt. Spreading carbohydrate throughout the day helps keep your blood sugar levels within your target range. Your daily amount depends on several things, including your weight, how active you are, which diabetes medicines you take, and what your goals are for your blood sugar levels. A registered dietitian or diabetes educator can help you plan how much carbohydrate to include in each meal and snack. A guideline for your daily amount of carbohydrate is:  · 45 to 60 grams at each meal. That's about the same as 3 to 4 carbohydrate servings. · 15 to 20 grams at each snack. That's about the same as 1 carbohydrate serving. The Nutrition Facts label on packaged foods tells you how much carbohydrate is in a serving of the food. First, look at the serving size on the food label. Is that the amount you eat in a serving? All of the nutrition information on a food label is based on that serving size. So if you eat more or less than that, you'll need to adjust the other numbers. Total carbohydrate is the next thing you need to look for on the label. If you count carbohydrate servings, one serving of carbohydrate is 15 grams. For foods that don't come with labels, such as fresh fruits and vegetables, you'll need a guide that lists carbohydrate in these foods. Ask your doctor, dietitian, or diabetes educator about books or other nutrition guides you can use. If you take insulin, you need to know how many grams of carbohydrate are in a meal. This lets you know how much rapid-acting insulin to take before you eat. If you use an insulin pump, you get a constant rate of insulin during the day.  So the pump must be programmed at meals to give you extra insulin to cover the rise in blood sugar after meals. When you know how much carbohydrate you will eat, you can take the right amount of insulin. Or, if you always use the same amount of insulin, you need to make sure that you eat the same amount of carbohydrate at meals. If you need more help to understand carbohydrate counting and food labels, ask your doctor, dietitian, or diabetes educator. How can you plan healthy meals? Here are some tips to get started:  · Plan your meals a week at a time. Don't forget to include snacks too. · Use cookbooks or online recipes to plan several main meals. Plan some quick meals for busy nights. You also can double some recipes that freeze well. Then you can save half for other busy nights when you don't have time to cook. · Make sure you have the ingredients you need for your recipes. If you're running low on basic items, put these items on your shopping list too. · List foods that you use to make breakfasts, lunches, and snacks. List plenty of fruits and vegetables. · Post this list on the refrigerator. Add to it as you think of more things you need. · Take the list to the store to do your weekly shopping. Follow-up care is a key part of your treatment and safety. Be sure to make and go to all appointments, and call your doctor if you are having problems. It's also a good idea to know your test results and keep a list of the medicines you take. Where can you learn more? Go to http://www.gray.com/  Enter N513 in the search box to learn more about \"Learning About Meal Planning for Diabetes. \"  Current as of: December 17, 2020               Content Version: 13.0  © 7381-2894 Healthwise, Incorporated. Care instructions adapted under license by [employer] (which disclaims liability or warranty for this information).  If you have questions about a medical condition or this instruction, always ask your healthcare professional. [employer], [facility] disclaims any warranty or liability for your use of this information. Learning About Tests When You Have Diabetes  Why do you need regular tests? Diabetes can lead to other health problems if it's not well controlled. You'll need tests to monitor how well your diabetes is controlled and to check for other things like high cholesterol or kidney problems. Having tests on a regular schedule can help your doctor find problems early, when it's easier to manage them. What tests do you need? These are the tests you may need and how often you should have them. A1c blood test.   This test shows the average level of blood sugar over the past 2 to 3 months. It helps your doctor see whether blood sugar levels have been staying within your target range. · How often: Every 3 to 6 months  · Goal: A blood sugar level in your target range  Blood pressure test.   This test measures the pressure of blood flow in the arteries. Controlling blood pressure can help prevent damage to nerves and blood vessels. · How often: Every 3 to 6 months  · Goal: A blood pressure level in your target range  Cholesterol test.   This test measures the amount of a type of fat in the blood. It is common for people with diabetes to also have high cholesterol. Too much cholesterol in the blood can build up inside the blood vessels and raise the risk for heart attack and stroke. · How often: At the time of your diabetes diagnosis, and as often as your doctor recommends after that  · Goal: A cholesterol level in your target range  Albumin-creatinine ratio test.   This test checks for kidney damage by looking for the protein albumin (say \"al-BYOO-erendira\") in the urine. Albumin is normally found in the blood. Kidney damage can let small amounts of it (microalbumin) leak into the urine. · How often: Once a year  · Goal: No protein in the urine  Blood creatinine test/estimated glomerular filtration (eGFR).    The blood creatinine (say \"fijh-QK-gx-neen\") level shows how well your kidneys are working. Creatinine is a waste product that muscles release into the blood. Blood creatinine is used to estimate the glomerular filtration rate. A high level of creatinine and/or a low eGFR may mean your kidneys are not working as well as they should. · How often: Once a year  · Goal: Normal level of creatinine in the blood. The eGFR goal is greater than 60 mL/min/1.73 m². Complete foot exam.   The doctor checks for foot sores and whether any sensation has been lost.  · How often: Once a year  · Goal: Healthy feet with no foot ulcers or loss of feeling  Dental exam and cleaning. The dentist checks for gum disease and tooth decay. People with high blood sugar are more likely to have these problems. · How often: Every 6 months  · Goal: Healthy teeth and gums  Complete eye exam.   High blood sugar levels can damage the eyes. This exam is done by an ophthalmologist or optometrist. It includes a dilated eye exam. The exam shows whether there's damage to the back of the eye (diabetic retinopathy). · How often: Once a year. If you don't have any signs of diabetic retinopathy, your doctor may recommend an exam every 2 years. · Goal: No damage to the back of the eye  Thyroid-stimulating hormone (TSH) blood test.   This test checks for thyroid disease. Too little thyroid hormone can cause some medicines (like insulin) to stay in the body longer. This can cause low blood sugar. You may be tested if you have high cholesterol or are a woman over 48years old. · How often: As part of your diabetes diagnosis, and as often as your doctor recommends after that  · Goal: Normal level of TSH in the blood  Follow-up care is a key part of your treatment and safety. Be sure to make and go to all appointments, and call your doctor if you are having problems. It's also a good idea to know your test results and keep a list of the medicines you take.   Where can you learn more? Go to http://www.gray.com/  Enter A243 in the search box to learn more about \"Learning About Tests When You Have Diabetes. \"  Current as of: August 31, 2020               Content Version: 13.0  © 9163-1166 Healthwise, Incorporated. Care instructions adapted under license by [employer] (which disclaims liability or warranty for this information). If you have questions about a medical condition or this instruction, always ask your healthcare professional. [place] [address] any warranty or liability for your use of this information.

## 2021-11-03 NOTE — PROGRESS NOTES
INTERNISTS OF [facility]:  11/3/2021, MRN: [id number]      Kimberlee Lara is a 63 y.o. female and presents to clinic for Follow-up and Labs (10-28-21)      Subjective: The patient is a 17-year-old female with history of eczema, vitiligo, genital herpes, type 2 diabetes, HLD, abnormal Pap smear, and BRAGG.     1. Type 2 DM: She is snacking on sweets. She eats 2 meals per day. She was diagnosed with diabetes earlier this year. No paresthesias and no blurry vision. She had an eye exam last month. She was told that her eye exam was \"alright. \"  She is apply medication. 2. HLD: She declined rx. Her last LDL was not under 100. Patient Active Problem List    Diagnosis Date Noted    Prediabetes 07/10/2020    Gastritis 07/10/2020    Mixed hyperlipidemia 07/15/2019    History of abnormal cervical Pap smear 07/15/2019    Eczema 05/31/2018    Vitiligo 05/31/2018    BRAGG (nonalcoholic steatohepatitis) 05/31/2018    Genital herpes 05/31/2018       Current Outpatient Medications   Medication Sig Dispense Refill    linaCLOtide (Linzess) 72 mcg cap capsule Take 72 mcg by mouth Daily (before breakfast).  omeprazole (PRILOSEC) 20 mg capsule Take 1 Cap by mouth daily. 90 Cap 3    ibuprofen (MOTRIN) 600 mg tablet Take  by mouth every six (6) hours as needed for Pain.  calcium carbonate (TUMS PO) Take  by mouth daily as needed.  valACYclovir (VALTREX) 500 mg tablet You can take 500mg twice a day for 3 days as needed for each flair up. (Patient not taking: Reported on 11/3/2021) 60 Tab 2    triamcinolone acetonide (KENALOG) 0.1 % topical cream Apply  to affected area two (2) times a day. use thin layer (Patient not taking: Reported on 7/14/2021) 45 g 11       No Known Allergies    Past Medical History:   Diagnosis Date    Fatty liver     GERD (gastroesophageal reflux disease)        No past surgical history on file.     Family History   Problem Relation Age of Onset    Hypertension Mother    [employer] Disease Mother     Diabetes Father     Stroke Father     Heart Attack Father     Diabetes Sister     Diabetes Brother     Heart Attack Brother        Social History     Tobacco Use    Smoking status: Never Smoker    Smokeless tobacco: Never Used   Substance Use Topics    Alcohol use: No       ROS   Review of Systems   Constitutional: Negative for chills and fever. HENT: Negative for ear pain and sore throat. Eyes: Negative for blurred vision and pain. Respiratory: Negative for cough and shortness of breath. Cardiovascular: Negative for chest pain. Gastrointestinal: Negative for abdominal pain, blood in stool and melena. Genitourinary: Negative for dysuria and hematuria. Musculoskeletal: Negative for joint pain and myalgias. Skin: Negative for rash. Neurological: Negative for headaches. Endo/Heme/Allergies: Does not bruise/bleed easily. Psychiatric/Behavioral: Negative for substance abuse. Objective     Vitals:    11/03/21 1028   BP: 134/85   Pulse: 72   Resp: 16   Temp: 97.8 °F (36.6 °C)   TempSrc: Temporal   SpO2: 99%   Weight: 180 lb (81.6 kg)   Height: 5' 2\" (1.575 m)   PainSc:   0 - No pain       Physical Exam  Vitals and nursing note reviewed. HENT:      Head: Normocephalic and atraumatic. Right Ear: External ear normal.      Left Ear: External ear normal.   Eyes:      General: No scleral icterus. Right eye: No discharge. Left eye: No discharge. Conjunctiva/sclera: Conjunctivae normal.   Cardiovascular:      Rate and Rhythm: Normal rate and regular rhythm. Heart sounds: Normal heart sounds. No murmur heard. No friction rub. No gallop. Pulmonary:      Effort: Pulmonary effort is normal. No respiratory distress. Breath sounds: Normal breath sounds. No wheezing or rales. Abdominal:      General: Bowel sounds are normal. There is no distension. Palpations: Abdomen is soft. There is no mass. Tenderness:  There is no abdominal tenderness. There is no guarding or rebound. Musculoskeletal:         General: No swelling (BUE) or tenderness (BUE). Cervical back: Neck supple. Lymphadenopathy:      Cervical: No cervical adenopathy. Skin:     General: Skin is warm and dry. Findings: No erythema or rash. Neurological:      Mental Status: She is alert. Motor: No abnormal muscle tone. Gait: Gait normal.         LABS   Data Review:   Lab Results   Component Value Date/Time    WBC 4.9 07/14/2021 09:42 AM    HGB 12.6 07/14/2021 09:42 AM    HCT 38.2 07/14/2021 09:42 AM    PLATELET 323 15/38/1785 09:42 AM    MCV 96.0 07/14/2021 09:42 AM       Lab Results   Component Value Date/Time    Sodium 139 07/14/2021 09:42 AM    Potassium 4.3 07/14/2021 09:42 AM    Chloride 106 07/14/2021 09:42 AM    CO2 24 07/14/2021 09:42 AM    Anion gap 9 07/14/2021 09:42 AM    Glucose 161 (H) 07/14/2021 09:42 AM    BUN 11 07/14/2021 09:42 AM    Creatinine 0.90 07/14/2021 09:42 AM    BUN/Creatinine ratio 12 07/14/2021 09:42 AM    GFR est AA >60 07/14/2021 09:42 AM    GFR est non-AA >60 07/14/2021 09:42 AM    Calcium 9.1 07/14/2021 09:42 AM       Lab Results   Component Value Date/Time    Cholesterol, total 199 10/26/2021 11:17 AM    HDL Cholesterol 46 10/26/2021 11:17 AM    LDL, calculated 131.6 (H) 10/26/2021 11:17 AM    VLDL, calculated 21.4 10/26/2021 11:17 AM    Triglyceride 107 10/26/2021 11:17 AM    CHOL/HDL Ratio 4.3 10/26/2021 11:17 AM       Lab Results   Component Value Date/Time    Hemoglobin A1c 6.8 (H) 10/28/2021 11:39 AM    Hemoglobin A1c, External 5.7 03/29/2018 12:00 AM       Assessment/Plan:   1. Health Maintenance:  -The flu vaccine was administered today. ORDERS:  - INFLUENZA VIRUS VAC QUAD,SPLIT,PRESV FREE SYRINGE IM    2. Type 2 Diabetes/HLD: Her A1c worsened to 6.8.  -We discussed the need to maintain a strict low-carb diet.   All questions were answered.  -We will check labs in 4 months.  -She continues to decline medication. Health Maintenance Due   Topic Date Due    Foot Exam Q1  Never done    Eye Exam Retinal or Dilated  Never done    Cervical cancer screen  Never done    Breast Cancer Screen Mammogram  03/09/2020         Lab review: labs are reviewed in the EHR and ordered as mentioned above. I have discussed the diagnosis with the patient and the intended plan as seen in the above orders. The patient has received an after-visit summary and questions were answered concerning future plans. I have discussed medication side effects and warnings with the patient as well. I have reviewed the plan of care with the patient, accepted their input and they are in agreement with the treatment goals. All questions were answered. The patient understands the plan of care. Handouts provided today with above information. Pt instructed if symptoms worsen to call the office or report to the ED for continued care. Greater than 50% of the visit time was spent in counseling and/or coordination of care. Voice recognition was used to generate this report, which may have resulted in some phonetic based errors in grammar and contents. Even though attempts were made to correct all the mistakes, some may have been missed, and remained in the body of the document.           Sebastien Tan MD

## 2021-11-09 PROBLEM — E11.9 DIABETES MELLITUS TYPE 2, DIET-CONTROLLED (HCC): Status: ACTIVE | Noted: 2021-11-09

## 2021-11-09 PROBLEM — R73.03 PREDIABETES: Status: RESOLVED | Noted: 2020-07-10 | Resolved: 2021-11-09

## 2022-03-18 PROBLEM — A60.00 GENITAL HERPES: Status: ACTIVE | Noted: 2018-05-31

## 2022-03-19 PROBLEM — L80 VITILIGO: Status: ACTIVE | Noted: 2018-05-31

## 2022-03-19 PROBLEM — K75.81 NASH (NONALCOHOLIC STEATOHEPATITIS): Status: ACTIVE | Noted: 2018-05-31

## 2022-03-19 PROBLEM — E78.2 MIXED HYPERLIPIDEMIA: Status: ACTIVE | Noted: 2019-07-15

## 2022-03-19 PROBLEM — Z87.42 HISTORY OF ABNORMAL CERVICAL PAP SMEAR: Status: ACTIVE | Noted: 2019-07-15

## 2022-03-19 PROBLEM — E11.9 DIABETES MELLITUS TYPE 2, DIET-CONTROLLED (HCC): Status: ACTIVE | Noted: 2021-11-09

## 2022-03-19 PROBLEM — K29.70 GASTRITIS: Status: ACTIVE | Noted: 2020-07-10

## 2022-03-19 PROBLEM — L30.9 ECZEMA: Status: ACTIVE | Noted: 2018-05-31

## 2022-07-08 ENCOUNTER — HOSPITAL ENCOUNTER (OUTPATIENT)
Dept: LAB | Age: 62
Discharge: HOME | End: 2022-07-08
Payer: OTHER GOVERNMENT

## 2022-07-08 ENCOUNTER — APPOINTMENT (OUTPATIENT)
Dept: INTERNAL MEDICINE CLINIC | Age: 62
End: 2022-07-08

## 2022-07-08 DIAGNOSIS — E78.2 MIXED HYPERLIPIDEMIA: ICD-10-CM

## 2022-07-08 DIAGNOSIS — E11.9 DIABETES MELLITUS TYPE 2, DIET-CONTROLLED (HCC): ICD-10-CM

## 2022-07-08 LAB
ALBUMIN SERPL-MCNC: 4.1 G/DL (ref 3.4–5)
ALBUMIN/GLOB SERPL: 1.1 {RATIO} (ref 0.8–1.7)
ALP SERPL-CCNC: 76 U/L (ref 45–117)
ALT SERPL-CCNC: 42 U/L (ref 13–56)
ANION GAP SERPL CALC-SCNC: 7 MMOL/L (ref 3–18)
AST SERPL-CCNC: 35 U/L (ref 10–38)
BILIRUB SERPL-MCNC: 0.8 MG/DL (ref 0.2–1)
BUN SERPL-MCNC: 8 MG/DL (ref 7–18)
BUN/CREAT SERPL: 9 (ref 12–20)
CALCIUM SERPL-MCNC: 9.3 MG/DL (ref 8.5–10.1)
CHLORIDE SERPL-SCNC: 105 MMOL/L (ref 100–111)
CHOLEST SERPL-MCNC: 210 MG/DL
CO2 SERPL-SCNC: 28 MMOL/L (ref 21–32)
CREAT SERPL-MCNC: 0.88 MG/DL (ref 0.6–1.3)
CREAT UR-MCNC: 176 MG/DL (ref 30–125)
EST. AVERAGE GLUCOSE BLD GHB EST-MCNC: 174 MG/DL
GLOBULIN SER CALC-MCNC: 3.7 G/DL (ref 2–4)
GLUCOSE SERPL-MCNC: 200 MG/DL (ref 74–99)
HBA1C MFR BLD: 7.7 % (ref 4.2–5.6)
HDLC SERPL-MCNC: 46 MG/DL (ref 40–60)
HDLC SERPL: 4.6 {RATIO} (ref 0–5)
LDLC SERPL CALC-MCNC: 136.6 MG/DL (ref 0–100)
LIPID PROFILE,FLP: ABNORMAL
MICROALBUMIN UR-MCNC: 1.31 MG/DL (ref 0–3)
MICROALBUMIN/CREAT UR-RTO: 7 MG/G (ref 0–30)
POTASSIUM SERPL-SCNC: 4.5 MMOL/L (ref 3.5–5.5)
PROT SERPL-MCNC: 7.8 G/DL (ref 6.4–8.2)
SODIUM SERPL-SCNC: 140 MMOL/L (ref 136–145)
TRIGL SERPL-MCNC: 137 MG/DL (ref ?–150)
VLDLC SERPL CALC-MCNC: 27.4 MG/DL

## 2022-07-08 PROCEDURE — 80053 COMPREHEN METABOLIC PANEL: CPT

## 2022-07-08 PROCEDURE — 82043 UR ALBUMIN QUANTITATIVE: CPT

## 2022-07-08 PROCEDURE — 80061 LIPID PANEL: CPT

## 2022-07-08 PROCEDURE — 36415 COLL VENOUS BLD VENIPUNCTURE: CPT

## 2022-07-08 PROCEDURE — 83036 HEMOGLOBIN GLYCOSYLATED A1C: CPT

## 2022-07-25 ENCOUNTER — HOSPITAL ENCOUNTER (OUTPATIENT)
Dept: LAB | Age: 62
Discharge: HOME | End: 2022-07-25
Payer: OTHER GOVERNMENT

## 2022-07-25 ENCOUNTER — OFFICE VISIT (OUTPATIENT)
Dept: INTERNAL MEDICINE CLINIC | Age: 62
End: 2022-07-25
Payer: OTHER GOVERNMENT

## 2022-07-25 ENCOUNTER — APPOINTMENT (OUTPATIENT)
Dept: INTERNAL MEDICINE CLINIC | Age: 62
End: 2022-07-25

## 2022-07-25 VITALS
HEART RATE: 68 BPM | HEIGHT: 62 IN | TEMPERATURE: 97.8 F | WEIGHT: 174 LBS | RESPIRATION RATE: 14 BRPM | SYSTOLIC BLOOD PRESSURE: 134 MMHG | DIASTOLIC BLOOD PRESSURE: 84 MMHG | BODY MASS INDEX: 32.02 KG/M2 | OXYGEN SATURATION: 99 %

## 2022-07-25 DIAGNOSIS — E11.9 DIABETES MELLITUS TYPE 2, DIET-CONTROLLED (HCC): ICD-10-CM

## 2022-07-25 DIAGNOSIS — S39.012A STRAIN OF LUMBAR REGION, INITIAL ENCOUNTER: ICD-10-CM

## 2022-07-25 DIAGNOSIS — K75.81 NASH (NONALCOHOLIC STEATOHEPATITIS): ICD-10-CM

## 2022-07-25 DIAGNOSIS — R10.11 RUQ PAIN: ICD-10-CM

## 2022-07-25 DIAGNOSIS — Z13.6 ENCOUNTER FOR SCREENING FOR CARDIOVASCULAR DISORDERS: ICD-10-CM

## 2022-07-25 DIAGNOSIS — R10.11 RUQ PAIN: Primary | ICD-10-CM

## 2022-07-25 PROCEDURE — 36415 COLL VENOUS BLD VENIPUNCTURE: CPT

## 2022-07-25 PROCEDURE — 3051F HG A1C>EQUAL 7.0%<8.0%: CPT | Performed by: INTERNAL MEDICINE

## 2022-07-25 PROCEDURE — 84450 TRANSFERASE (AST) (SGOT): CPT

## 2022-07-25 PROCEDURE — 99214 OFFICE O/P EST MOD 30 MIN: CPT | Performed by: INTERNAL MEDICINE

## 2022-07-25 NOTE — PROGRESS NOTES
Meli Waggoner presents today for   Chief Complaint   Patient presents with    Follow-up    Labs     7-8-22         1. \"Have you been to the ER, urgent care clinic since your last visit? Hospitalized since your last visit? \" no    2. \"Have you seen or consulted any other health care providers outside of the [address] [place] [address] since your last visit? \" yes     3. For patients aged 39-70: Has the patient had a colonoscopy / FIT/ Cologuard? Yes - no Care Gap present      If the patient is female:    4. For patients aged 41-77: Has the patient had a mammogram within the past 2 years? Yes - Care Gap present. Rooming MA/LPN to request most recent results  See top three    5. For patients aged 21-65: Has the patient had a pap smear?  No

## 2022-07-25 NOTE — PROGRESS NOTES
INTERNISTS OF [facility]:  7/25/2022, MRN: [id number]      Eryn Barrera is a 63 y.o. female and presents to clinic for Follow-up and Labs (7-8-22)      Subjective: The patient is a 62-year-old female with history of eczema, vitiligo, genital herpes, type 2 diabetes, HLD, abnormal Pap smear, and BRAGG. 1. Right Upper Quadrant Pain/Back Pain:  Tomato products trigger sx. Po intake triggers sx. She has pain off/on. \"I feel like it's my liver. \" \"It's something deeper. \" No radiation of pain. No weakness/paresthesias. Not eating helps to relieve her pain. Taking linzess causes more pain. No bleeding hx. No urinary or vaginal sx. +Nocturia. She drinks fluids a lot after 7pm. She drinks 2 bottles of water, ginger ale, hot tea, and grape juice after 5pm. +NAFLD. 2. Type 2 DM: She eats once a day. No microalbuminuria. Her hemoglobin A1C is up to 7.7. She declines rx. Patient Active Problem List    Diagnosis Date Noted    Di[facility] mellitus type [facility], [facility] 11/09/2021    Gastritis 07/10/2020    Mixed hyperlipidemia 07/15/2019    History of abnormal cervical Pap smear 07/15/2019    Eczema 05/31/2018    Vitiligo 05/31/2018    BRAGG (nonalcoholic steatohepatitis) 05/31/2018    Genital herpes 05/31/2018       Current Outpatient Medications   Medication Sig Dispense Refill    linaCLOtide (LINZESS) 72 mcg cap capsule Take 72 mcg by mouth Daily (before breakfast). omeprazole (PRILOSEC) 20 mg capsule Take 1 Cap by mouth daily. 90 Cap 3    ibuprofen (MOTRIN) 600 mg tablet Take  by mouth every six (6) hours as needed for Pain. calcium carbonate (TUMS PO) Take  by mouth daily as needed. valACYclovir (VALTREX) 500 mg tablet You can take 500mg twice a day for 3 days as needed for each flair up. (Patient not taking: No sig reported) 60 Tab 2    triamcinolone acetonide (KENALOG) 0.1 % topical cream Apply  to affected area two (2) times a day.  use thin layer (Patient not taking: No sig reported) 45 g 11       No Known Allergies    Past Medical History:   Diagnosis Date    Fatty liver     GERD (gastroesophageal reflux disease)        No past surgical history on file. Family History   Problem Relation Age of Onset    Hypertension Mother     Heart Disease Mother     Diabetes Father     Stroke Father     Heart Attack Father     Diabetes Sister     Diabetes Brother     Heart Attack Brother        Social History     Tobacco Use    Smoking status: Never    Smokeless tobacco: Never   Substance Use Topics    Alcohol use: No       ROS   Review of Systems   Constitutional:  Negative for chills and fever. HENT:  Negative for ear pain and sore throat. Eyes:  Negative for blurred vision and pain. Respiratory:  Negative for cough and shortness of breath. Cardiovascular:  Negative for chest pain. Gastrointestinal:  Positive for abdominal pain. Negative for blood in stool and melena. Genitourinary:  Negative for dysuria and hematuria. Musculoskeletal:  Positive for back pain (right sided). Negative for joint pain and myalgias. Skin:  Negative for rash. Neurological:  Negative for headaches. Endo/Heme/Allergies:  Does not bruise/bleed easily. Psychiatric/Behavioral:  Negative for substance abuse. Objective     Vitals:    07/25/22 1104   BP: 134/84   Pulse: 68   Resp: 14   Temp: 97.8 °F (36.6 °C)   TempSrc: Temporal   SpO2: 99%   Weight: 174 lb (78.9 kg)   Height: 5' 2\" (1.575 m)   PainSc:   0 - No pain       Physical Exam  Vitals and nursing note reviewed. HENT:      Head: Normocephalic and atraumatic. Right Ear: External ear normal.      Left Ear: External ear normal.   Eyes:      General: No scleral icterus. Right eye: No discharge. Left eye: No discharge. Conjunctiva/sclera: Conjunctivae normal.   Cardiovascular:      Rate and Rhythm: Normal rate and regular rhythm. Heart sounds: Normal heart sounds. No murmur heard. No friction rub. No gallop. Pulmonary:      Effort: Pulmonary effort is normal. No respiratory distress. Breath sounds: Normal breath sounds. No wheezing or rales. Abdominal:      General: Bowel sounds are normal. There is no distension. Palpations: Abdomen is soft. There is no mass. Tenderness: There is no abdominal tenderness. There is no guarding or rebound. Musculoskeletal:         General: No swelling (BUE) or tenderness (BUE). Cervical back: Neck supple. Comments: She has right-sided lumbar paraspinal muscles are tender to palpation. Lymphadenopathy:      Cervical: No cervical adenopathy. Skin:     General: Skin is warm and dry. Findings: No erythema or rash. Neurological:      Mental Status: She is alert. Motor: No abnormal muscle tone.    Psychiatric:         Mood and Affect: Mood normal.       LABS   Data Review:   Lab Results   Component Value Date/Time    WBC 4.9 07/14/2021 09:42 AM    HGB 12.6 07/14/2021 09:42 AM    HCT 38.2 07/14/2021 09:42 AM    PLATELET 159 79/62/2517 09:42 AM    MCV 96.0 07/14/2021 09:42 AM       Lab Results   Component Value Date/Time    Sodium 140 07/08/2022 08:40 AM    Potassium 4.5 07/08/2022 08:40 AM    Chloride 105 07/08/2022 08:40 AM    CO2 28 07/08/2022 08:40 AM    Anion gap 7 07/08/2022 08:40 AM    Glucose 200 (H) 07/08/2022 08:40 AM    BUN 8 07/08/2022 08:40 AM    Creatinine 0.88 07/08/2022 08:40 AM    BUN/Creatinine ratio 9 (L) 07/08/2022 08:40 AM    GFR est AA >60 07/08/2022 08:40 AM    GFR est non-AA >60 07/08/2022 08:40 AM    Calcium 9.3 07/08/2022 08:40 AM       Lab Results   Component Value Date/Time    Cholesterol, total 210 (H) 07/08/2022 08:40 AM    HDL Cholesterol 46 07/08/2022 08:40 AM    LDL, calculated 136.6 (H) 07/08/2022 08:40 AM    VLDL, calculated 27.4 07/08/2022 08:40 AM    Triglyceride 137 07/08/2022 08:40 AM    CHOL/HDL Ratio 4.6 07/08/2022 08:40 AM       Lab Results   Component Value Date/Time    Hemoglobin A1c 7.7 (H) 07/08/2022 08:40 AM    Hemoglobin A1c, External 5.7 03/29/2018 12:00 AM       Assessment/Plan:   1. Diabetes mellitus type 2, diet-controlled: Her A1c is up to 7.7.  -She still declines medication.  - I encouraged her to reduce her carb intake. - Placing a referral to a nutritionist for assistance. - Ordering a heart scan per her request to screen for cardiovascular disease. ORDERS:  - REFERRAL TO NUTRITION  - CT HEART W/O CONT WITH CALCIUM; Future    2. RUQ pain: From cholestasis? -Ordering a HIDA scan. ORDERS:  - NM HEPATOBILIARY DUCT SCAN; Future    3. BRAGG (nonalcoholic steatohepatitis):   -Ordering a FibroSure test.    ORDERS:  - BRAGG FIBROSURE; Future  - CT HEART W/O CONT WITH CALCIUM; Future    4. Lower Back Pain: Right-sided.  -I encouraged her to do stretching exercises. She can also apply warm compresses and try massage.    -She declines medications (a muscle relaxant)  -Activity as tolerated. Health Maintenance Due   Topic Date Due    Pneumococcal 0-64 years (1 - PCV) Never done    Foot Exam Q1  Never done    Eye Exam Retinal or Dilated  Never done    Cervical cancer screen  Never done    Breast Cancer Screen Mammogram  03/09/2020    COVID-19 Vaccine (3 - Booster for [employer] series) 07/05/2021         Lab review: labs are reviewed in the EHR and ordered as mentioned above. I have discussed the diagnosis with the patient and the intended plan as seen in the above orders. The patient has received an after-visit summary and questions were answered concerning future plans. I have discussed medication side effects and warnings with the patient as well. I have reviewed the plan of care with the patient, accepted their input and they are in agreement with the treatment goals. All questions were answered. The patient understands the plan of care. Handouts provided today with above information. \Pt instructed if symptoms worsen to call the office or report to the ED for continued care.   Greater than 50% of the visit time was spent in counseling and/or coordination of care. Voice recognition was used to generate this report, which may have resulted in some phonetic based errors in grammar and contents. Even though attempts were made to correct all the mistakes, some may have been missed, and remained in the body of the document.           Amairani Arellano MD

## 2022-07-27 LAB
A2 MACROGLOB SERPL-MCNC: 114 MG/DL (ref 110–276)
ALT (SGPT) P5P, 001547: 30 IU/L (ref 0–40)
APO A-I SERPL-MCNC: 133 MG/DL (ref 116–209)
AST SERPL W P-5'-P-CCNC: 27 IU/L (ref 0–40)
BILIRUB SERPL-MCNC: 0.5 MG/DL (ref 0–1.2)
CHOLEST SERPL-MCNC: 196 MG/DL (ref 100–199)
COMMENT:: ABNORMAL
FIBROSIS SCORING:, 550107: ABNORMAL
FIBROSIS STAGE SERPL QL: ABNORMAL
GGT SERPL-CCNC: 33 IU/L (ref 0–60)
GLUCOSE SERPL-MCNC: 191 MG/DL (ref 65–99)
HAPTOGLOB SERPL-MCNC: 191 MG/DL (ref 33–346)
HEIGHT (NASH), 13912: 62
INTERPRETATIONS:, 550143: ABNORMAL
LIVER FIBR SCORE SERPL CALC.FIBROSURE: 0.08 (ref 0–0.21)
NASH SCORING, 550144: ABNORMAL
NECROINFLAMMATORY ACT GRADE SERPL QL: ABNORMAL
NECROINFLAMMATORY ACT SCORE SERPL: 0.5
SERVICE CMNT-IMP: ABNORMAL
STEATOSIS GRADE, 550153: ABNORMAL
STEATOSIS GRADING, 550189: ABNORMAL
STEATOSIS SCORE, 550149: 0.81 (ref 0–0.3)
TRIGL SERPL-MCNC: 105 MG/DL (ref 0–149)
WEIGHT (NASH): 182

## 2022-08-04 ENCOUNTER — TELEPHONE (OUTPATIENT)
Dept: INTERNAL MEDICINE CLINIC | Age: 62
End: 2022-08-04

## 2022-08-04 DIAGNOSIS — R10.11 RUQ PAIN: ICD-10-CM

## 2022-08-04 DIAGNOSIS — R10.11 RUQ PAIN: Primary | ICD-10-CM

## 2022-08-04 NOTE — TELEPHONE ENCOUNTER
Shon is calling from [address] [place] [address] stating patient is scheduled for a HIDA scan tomorrow. Stating Dr. Cheryl Rider ordered it with no EF. She wants to know of the doctor wants to add that.

## 2022-08-05 ENCOUNTER — HOSPITAL ENCOUNTER (OUTPATIENT)
Dept: NUCLEAR MEDICINE | Age: 62
Discharge: HOME | End: 2022-08-05
Attending: INTERNAL MEDICINE
Payer: OTHER GOVERNMENT

## 2022-08-05 DIAGNOSIS — R10.11 RUQ PAIN: ICD-10-CM

## 2022-08-05 RX ORDER — KIT FOR THE PREPARATION OF TECHNETIUM TC 99M MEBROFENIN 45 MG/10ML
6.2 INJECTION, POWDER, LYOPHILIZED, FOR SOLUTION INTRAVENOUS
Status: COMPLETED | OUTPATIENT
Start: 2022-08-05 | End: 2022-08-05

## 2022-08-05 RX ADMIN — KIT FOR THE PREPARATION OF TECHNETIUM TC 99M MEBROFENIN 6.2 MILLICURIE: 45 INJECTION, POWDER, LYOPHILIZED, FOR SOLUTION INTRAVENOUS at 12:00

## 2022-08-08 ENCOUNTER — HOSPITAL ENCOUNTER (OUTPATIENT)
Dept: CT IMAGING | Age: 62
Discharge: HOME | End: 2022-08-08
Attending: INTERNAL MEDICINE
Payer: SELF-PAY

## 2022-08-08 PROCEDURE — 75571 CT HRT W/O DYE W/CA TEST: CPT

## 2022-08-08 NOTE — PROGRESS NOTES
Please schedule her for an appointment to discuss her Fibrosure results. Although her HIDA scan did not show any evidence of gallbladder related disease, her lab work is reflecting significant fatty liver disease. Please schedule her for an appointment to discuss treatment options.     Dr. Ravin Delatorre  Internists of [address].  Phone: [phone]  Fax: [phone]

## 2022-08-08 NOTE — PROGRESS NOTES
Please let her know that her HIDA scan does not show any abnormalities along her gallbladder and liver.     Dr. Preston Officer  [facility] of [facility], [address] [place] [address], [address] [place] [address] Str.  Phone: [phone]  Fax: [phone]

## 2022-08-09 ENCOUNTER — TELEPHONE (OUTPATIENT)
Dept: INTERNAL MEDICINE CLINIC | Age: 62
End: 2022-08-09

## 2022-08-09 NOTE — TELEPHONE ENCOUNTER
Left message for patient to call the office. RE:  ----- Message from Froylan Lombard, MD sent at 8/8/2022  3:45 PM EDT -----  Please let her know that her HIDA scan does not show any abnormalities along her gallbladder and liver.

## 2022-08-09 NOTE — TELEPHONE ENCOUNTER
Patient returned call and was given message. Patient verbalized understanding and has no further questions at this time. Patient has an appt for 9/9/22.

## 2022-08-09 NOTE — TELEPHONE ENCOUNTER
----- Message from Latosha Cornell MD sent at 8/8/2022  3:45 PM EDT -----  Please let her know that her HIDA scan does not show any abnormalities along her gallbladder and liver.     Dr. Chris Snyder  [facility]ists of [address], [address] [place] [address] Str.  Phone: [phone]  Fax: [phone]

## 2022-08-09 NOTE — TELEPHONE ENCOUNTER
----- Message from Anyi Rodarte MD sent at 8/8/2022  3:48 PM EDT -----  Please schedule her for an appointment to discuss her Fibrosure results. Although her HIDA scan did not show any evidence of gallbladder related disease, her lab work is reflecting significant fatty liver disease. Please schedule her for an appointment to discuss treatment options.     Dr. Ismael Vallecillo  [facility] of [facility], [address] [place] [address], [address] [place] [address] Str.  Phone: [phone]  Fax: [phone]

## 2022-08-15 ENCOUNTER — TELEPHONE (OUTPATIENT)
Dept: INTERNAL MEDICINE CLINIC | Age: 62
End: 2022-08-15

## 2022-08-15 NOTE — TELEPHONE ENCOUNTER
----- Message from Zheng Hunt MD sent at 8/15/2022  1:05 PM EDT -----  Please let her know that her heart scan shows evidence of coronary calcium present. This indicates that she has plaque developing along her coronary arteries. Additionally, she has extensive fatty liver disease. I will discuss this more in depth with her at her upcoming appointment.     Dr. Anna Guard  [facility] of [facility], [address] [place] [address], [address] [place] [address] Str.  Phone: [phone]  Fax: [phone]

## 2022-08-15 NOTE — PROGRESS NOTES
Please let her know that her heart scan shows evidence of coronary calcium present. This indicates that she has plaque developing along her coronary arteries. Additionally, she has extensive fatty liver disease. I will discuss this more in depth with her at her upcoming appointment.     Dr. Preston Officer  [facility] of [facility], [address] [place] [address], [address] [place] [address] Str.  Phone: [phone]  Fax: [phone]

## 2022-09-22 PROBLEM — K76.0 FATTY (CHANGE OF) LIVER, NOT ELSEWHERE CLASSIFIED: Status: ACTIVE | Noted: 2022-09-22

## 2022-09-22 PROBLEM — H52.229 REGULAR ASTIGMATISM: Status: ACTIVE | Noted: 2022-09-22

## 2022-09-22 PROBLEM — R10.13 EPIGASTRIC PAIN: Status: ACTIVE | Noted: 2022-09-22

## 2022-09-22 PROBLEM — H52.4 PRESBYOPIA: Status: ACTIVE | Noted: 2022-09-22

## 2022-09-22 PROBLEM — H26.019 CORTICAL, LAMELLAR, OR ZONULAR CATARACT, NONSENILE: Status: ACTIVE | Noted: 2022-09-22

## 2022-09-22 PROBLEM — K59.09 CHRONIC CONSTIPATION: Status: ACTIVE | Noted: 2022-09-22

## 2022-09-22 PROBLEM — E66.9 OBESITY: Status: ACTIVE | Noted: 2022-09-22

## 2022-09-22 PROBLEM — J30.9 ALLERGIC RHINITIS: Status: ACTIVE | Noted: 2022-09-22

## 2022-09-22 PROBLEM — M94.0 COSTOCHONDRITIS: Status: ACTIVE | Noted: 2022-09-22

## 2022-09-22 PROBLEM — M25.519 ARTHRALGIA OF SHOULDER: Status: ACTIVE | Noted: 2022-09-22

## 2022-09-22 NOTE — PROGRESS NOTES
Mary Wall presents today for   Chief Complaint   Patient presents with    Follow Up Chronic Condition     Diabetes     1. \"Have you been to the ER, urgent care clinic since your last visit? Hospitalized since your last visit? \" no    2. \"Have you seen or consulted any other health care providers outside of the [address] [place] [address] since your last visit? \" no     3. For patients aged 39-70: Has the patient had a colonoscopy / FIT/ Cologuard? Yes - no Care Gap present      If the patient is female:    4. For patients aged 41-77: Has the patient had a mammogram within the past 2 years? No  See top three    5. For patients aged 21-65: Has the patient had a pap smear?  No

## 2022-09-23 ENCOUNTER — OFFICE VISIT (OUTPATIENT)
Dept: INTERNAL MEDICINE CLINIC | Age: 62
End: 2022-09-23
Payer: OTHER GOVERNMENT

## 2022-09-23 VITALS
RESPIRATION RATE: 16 BRPM | OXYGEN SATURATION: 98 % | HEART RATE: 70 BPM | TEMPERATURE: 97.8 F | SYSTOLIC BLOOD PRESSURE: 135 MMHG | DIASTOLIC BLOOD PRESSURE: 84 MMHG | WEIGHT: 165.6 LBS | HEIGHT: 62 IN | BODY MASS INDEX: 30.47 KG/M2

## 2022-09-23 DIAGNOSIS — E11.9 DIABETES MELLITUS TYPE 2, DIET-CONTROLLED (HCC): Primary | ICD-10-CM

## 2022-09-23 DIAGNOSIS — R10.11 RUQ PAIN: ICD-10-CM

## 2022-09-23 DIAGNOSIS — K75.81 NASH (NONALCOHOLIC STEATOHEPATITIS): ICD-10-CM

## 2022-09-23 PROCEDURE — 99214 OFFICE O/P EST MOD 30 MIN: CPT | Performed by: INTERNAL MEDICINE

## 2022-09-23 PROCEDURE — 3051F HG A1C>EQUAL 7.0%<8.0%: CPT | Performed by: INTERNAL MEDICINE

## 2022-09-23 NOTE — PROGRESS NOTES
INTERNISTS OF [facility]:  9/23/2022, MRN: [id number]      Carlie Romano is a 63 y.o. female and presents to clinic for Follow Up Chronic Condition (Diabetes)      Subjective: The patient is a 61-year-old female with history of eczema, vitiligo, genital herpes, type 2 diabetes, HLD, abnormal Pap smear, and BRAGG. 1. RUQ Pain: Reported at her last appointment. Triggered by consuming tomato products. Triggers also include other intake of certain foods. Pain was intermittent but did not radiate. Taking Linzess caused more pain. She denied urinary and vaginal symptoms. No hematuria, hematochezia, vaginal bleeding, or melena. Since her last appointment, she had an unremarkable HIDA scan. Her abdominal pain resolved. 8/5/22 HIDA Scan: No diagnostic abnormality. 2.  NAFLD and Coronary Calcium: Since her last visit, she had a heart scan. Findings are listed below. 8/8/22 Heart Scan: Marked fatty infiltration of the liver. Coronary calcium score 139.    3.  Type 2 diabetes: Her last A1c was 7.7. She declined medication for better control of her diabetes, wanting to try aggressive lifestyle modification measures. She states that sometimes she will eat p.m. butter crackers and oatmeal for breakfast.  She will then get lunch and eat some type of meat, vegetable, and carbohydrate for dinner. She is scheduled to see a nutritionist in a month. Note that her LDL when checked this summer was above 100. She is not on a statin.       Patient Active Problem List    Diagnosis Date Noted    Arthralgia of shoulder 09/22/2022    Allergic rhinitis 09/22/2022    Borderline glaucoma, open angle with borderline findings 09/22/2022    Chronic constipation 09/22/2022    Cortical, lamellar, or zonular cataract, nonsenile 09/22/2022    Costochondritis 09/22/2022    Epigastric pain 09/22/2022    Fatty (change of) liver, not elsewhere classified 09/22/2022    Obesity 09/22/2022    Presbyopia 09/22/2022    Regular astigmatism 09/22/2022    Diabetes mellitus type 2, diet-controlled ([facility] 11/09/2021    Gastritis 07/10/2020    Mixed hyperlipidemia 07/15/2019    History of abnormal cervical Pap smear 07/15/2019    Eczema 05/31/2018    Vitiligo 05/31/2018    BRAGG (nonalcoholic steatohepatitis) 05/31/2018    Genital herpes 05/31/2018       Current Outpatient Medications   Medication Sig Dispense Refill    omeprazole (PRILOSEC) 20 mg capsule Take 1 Cap by mouth daily. 90 Cap 3    ibuprofen (MOTRIN) 600 mg tablet Take  by mouth every six (6) hours as needed for Pain. calcium carbonate (TUMS PO) Take  by mouth daily as needed. valACYclovir (VALTREX) 500 mg tablet You can take 500mg twice a day for 3 days as needed for each flair up. (Patient not taking: No sig reported) 60 Tab 2    triamcinolone acetonide (KENALOG) 0.1 % topical cream Apply  to affected area two (2) times a day. use thin layer (Patient not taking: No sig reported) 45 g 11       Allergies   Allergen Reactions    Linzess [Linaclotide] Other (comments)     Abdominal pain       Past Medical History:   Diagnosis Date    Fatty liver     GERD (gastroesophageal reflux disease)        No past surgical history on file. Family History   Problem Relation Age of Onset    Hypertension Mother     Heart Disease Mother     Diabetes Father     Stroke Father     Heart Attack Father     Diabetes Sister     Diabetes Brother     Heart Attack Brother        Social History     Tobacco Use    Smoking status: Never    Smokeless tobacco: Never   Substance Use Topics    Alcohol use: No       ROS   Review of Systems   Constitutional:  Negative for chills and fever. HENT:  Negative for ear pain and sore throat. Eyes:  Negative for blurred vision and pain. Respiratory:  Negative for cough and shortness of breath. Cardiovascular:  Negative for chest pain. Gastrointestinal:  Negative for abdominal pain, blood in stool and melena.    Genitourinary:  Negative for dysuria and hematuria. Musculoskeletal:  Negative for joint pain and myalgias. Skin:  Negative for rash. Neurological:  Negative for headaches. Endo/Heme/Allergies:  Does not bruise/bleed easily. Psychiatric/Behavioral:  Negative for substance abuse. Objective     Vitals:    09/23/22 1033   BP: 135/84   Pulse: 70   Resp: 16   Temp: 97.8 °F (36.6 °C)   TempSrc: Temporal   SpO2: 98%   Weight: 165 lb 9.6 oz (75.1 kg)   Height: 5' 2\" (1.575 m)   PainSc:   0 - No pain       Physical Exam  Vitals and nursing note reviewed. HENT:      Head: Normocephalic and atraumatic. Right Ear: External ear normal.      Left Ear: External ear normal.   Eyes:      General: No scleral icterus. Right eye: No discharge. Left eye: No discharge. Conjunctiva/sclera: Conjunctivae normal.   Cardiovascular:      Rate and Rhythm: Normal rate and regular rhythm. Heart sounds: Normal heart sounds. No murmur heard. No friction rub. No gallop. Pulmonary:      Effort: Pulmonary effort is normal. No respiratory distress. Breath sounds: Normal breath sounds. No wheezing or rales. Abdominal:      General: Bowel sounds are normal. There is no distension. Palpations: Abdomen is soft. There is no mass. Tenderness: There is no abdominal tenderness. There is no guarding or rebound. Musculoskeletal:         General: No swelling (BUE) or tenderness (BUE). Cervical back: Neck supple. Lymphadenopathy:      Cervical: No cervical adenopathy. Skin:     General: Skin is warm and dry. Findings: No erythema or rash. Neurological:      Mental Status: She is alert. Motor: No abnormal muscle tone.       Gait: Gait normal.   Psychiatric:         Mood and Affect: Mood normal.       LABS   Data Review:   Lab Results   Component Value Date/Time    WBC 4.9 07/14/2021 09:42 AM    HGB 12.6 07/14/2021 09:42 AM    HCT 38.2 07/14/2021 09:42 AM    PLATELET 846 72/75/1699 09:42 AM    MCV 96.0 07/14/2021 09:42 AM       Lab Results   Component Value Date/Time    Sodium 140 07/08/2022 08:40 AM    Potassium 4.5 07/08/2022 08:40 AM    Chloride 105 07/08/2022 08:40 AM    CO2 28 07/08/2022 08:40 AM    Anion gap 7 07/08/2022 08:40 AM    Glucose 191 (H) 07/25/2022 12:02 PM    BUN 8 07/08/2022 08:40 AM    Creatinine 0.88 07/08/2022 08:40 AM    BUN/Creatinine ratio 9 (L) 07/08/2022 08:40 AM    GFR est AA >60 07/08/2022 08:40 AM    GFR est non-AA >60 07/08/2022 08:40 AM    Calcium 9.3 07/08/2022 08:40 AM       Lab Results   Component Value Date/Time    Cholesterol, total 196 07/25/2022 12:02 PM    HDL Cholesterol 46 07/08/2022 08:40 AM    LDL, calculated 136.6 (H) 07/08/2022 08:40 AM    VLDL, calculated 27.4 07/08/2022 08:40 AM    Triglyceride 105 07/25/2022 12:02 PM    CHOL/HDL Ratio 4.6 07/08/2022 08:40 AM       Lab Results   Component Value Date/Time    Hemoglobin A1c 7.7 (H) 07/08/2022 08:40 AM    Hemoglobin A1c, External 5.7 03/29/2018 12:00 AM       Assessment/Plan:   1. RUQ pain: Etiology is unknown but it resolved. Her HIDA scan was normal. Observation. 2. Diabetes mellitus type 2, diet-controlled: Her last LDL was not at goal.  -I encouraged her to follow-up with the nutritionist next month for aggressive lifestyle modification measures. - She was given information for some free diabetes education classes out in the community.  - We will check a lipid panel and A1c just before her follow-up visit in December. - I encouraged her to maintain a low-carb diet. I spent some time counseling her on high carb foods such as nabs and oatmeal.-I encouraged her to try some low-carb alternatives. ORDERS:  - LIPID PANEL; Future  - HEMOGLOBIN A1C WITH EAG; Future    3. BRAGG (nonalcoholic steatohepatitis): Seen on her recent heart scan she also has coronary calcifications noted per her heart scan.  -I stressed the importance of maintaining a low-carb heart healthy diet.   - I encouraged her to reduce her weight by maintaining a healthy diet. - I will check a blood pressure in December. - I discussed that she should be on some type of cholesterol-lowering medication given her increased CVD risk 2/2 #2. She will also hold off at this time. ORDERS:  - BRAGG FIBROSURE; Future        Health Maintenance Due   Topic Date Due    Pneumococcal 0-64 years (1 - PCV) Never done    Foot Exam Q1  Never done    Eye Exam Retinal or Dilated  Never done    Cervical cancer screen  Never done    Breast Cancer Screen Mammogram  03/09/2020    COVID-19 Vaccine (3 - Booster for [employer] series) 07/05/2021    Flu Vaccine (1) 08/01/2022         Lab review: labs are reviewed in the EHR order as mentioned above. I have discussed the diagnosis with the patient and the intended plan as seen in the above orders. The patient has received an after-visit summary and questions were answered concerning future plans. I have discussed medication side effects and warnings with the patient as well. I have reviewed the plan of care with the patient, accepted their input and they are in agreement with the treatment goals. All questions were answered. The patient understands the plan of care. Handouts provided today with above information. Pt instructed if symptoms worsen to call the office or report to the ED for continued care. Greater than 50% of the visit time was spent in counseling and/or coordination of care. Voice recognition was used to generate this report, which may have resulted in some phonetic based errors in grammar and contents. Even though attempts were made to correct all the mistakes, some may have been missed, and remained in the body of the document.           Vinay Mcdonough MD

## 2022-09-23 NOTE — PATIENT INSTRUCTIONS
Learning About Meal Planning for Diabetes  Why plan your meals? Meal planning can be a key part of managing diabetes. Planning meals and snacks with the right balance of carbohydrate, protein, and fat can help you keep your blood sugar at the target level you set with your doctor. You don't have to eat special foods. You can eat what your family eats, including sweets once in a while. But you do have to pay attention to how often you eat and how much you eat of certain foods. You may want to work with a dietitian or a diabetes educator. They can give you tips and meal ideas and can answer your questions about meal planning. This health professional can also help you reach a healthy weight if that is one of your goals. What plan is right for you? Your dietitian or diabetes educator may suggest that you start with the plate format or carbohydrate counting. The plate format  The plate format is a simple way to help you manage how you eat. You plan meals by learning how much space each food should take on a plate. Using the plate format helps you manage the amount of carbohydrate you eat. It can make it easier to keep your blood sugar level within your target range. It also helps you see if you're eating healthy portion sizes. To use the plate format, you put non-starchy vegetables on half your plate. Add lean protein foods, such as fish, lean meats and poultry, or soy products, on one-quarter of the plate. Put a grain or starchy vegetable (such as brown rice or a potato) on the final quarter of the plate. You can add a small piece of fruit and some low-fat or fat-free milk or yogurt, depending on your carbohydrate goal for each meal.  Here are some tips for using the plate format:  Make sure that you are not using an oversized plate. A 9-inch plate is best. Many restaurants use larger plates. Get used to using the plate format at home. Then you can use it when you eat out.   Write down your questions about using the plate format. Talk to your doctor, a dietitian, or a diabetes educator about your concerns. Carbohydrate counting  With carbohydrate counting, you plan meals based on the amount of carbohydrate in each food. Carbohydrate raises blood sugar higher and more quickly than any other nutrient. It is found in desserts, breads and cereals, and fruit. It's also found in starchy vegetables such as potatoes and corn, grains such as rice and pasta, and milk and yogurt. You can help keep your blood sugar levels within your target range by planning how much carbohydrate to have at meals and snacks. The amount you need depends on several things. These include your weight, how active you are, which diabetes medicines you take, and what your goals are for your blood sugar levels. A registered dietitian or diabetes educator can help you plan how much carbohydrate to include in each meal and snack. An example of a carbohydrate counting plan is:  45 to 60 grams at each meal. That's about the same as 3 to 4 carbohydrate servings. 15 to 20 grams at each snack. That's about the same as 1 carbohydrate serving. The Nutrition Facts label on packaged foods tells you how much carbohydrate is in a serving of the food. First, look at the serving size on the food label. Is that the amount you eat in a serving? All of the nutrition information on a food label is based on that serving size. So if you eat more or less than that, you'll need to adjust the other numbers. Total carbohydrate is the next thing you need to look for on the label. If you count carbohydrate servings, one serving of carbohydrate is 15 grams. For foods that don't come with labels, such as fresh fruits and vegetables, you'll need a guide that lists carbohydrate in these foods. Ask your doctor, dietitian, or diabetes educator about books or other nutrition guides you can use.   If you take insulin, you need to know how many grams of carbohydrate are in a meal. This lets you know how much rapid-acting insulin to take before you eat. If you use an insulin pump, you get a constant rate of insulin during the day. So the pump must be programmed at meals to give you extra insulin to cover the rise in blood sugar after meals. When you know how much carbohydrate you will eat, you can take the right amount of insulin. Or, if you always use the same amount of insulin, you need to make sure that you eat the same amount of carbohydrate at meals. If you need more help to understand carbohydrate counting and food labels, ask your doctor, dietitian, or diabetes educator. How can you plan healthy meals? Here are some tips to get started:  Plan your meals a week at a time. Don't forget to include snacks too. Use cookbooks or online recipes to plan several main meals. Plan some quick meals for busy nights. You also can double some recipes that freeze well. Then you can save half for other busy nights when you don't have time to cook. Make sure you have the ingredients you need for your recipes. If you're running low on basic items, put these items on your shopping list too. List foods that you use to make breakfasts, lunches, and snacks. List plenty of fruits and vegetables. Post this list on the refrigerator. Add to it as you think of more things you need. Take the list to the store to do your weekly shopping. Follow-up care is a key part of your treatment and safety. Be sure to make and go to all appointments, and call your doctor if you are having problems. It's also a good idea to know your test results and keep a list of the medicines you take. Where can you learn more? Go to http://www.gray.com/  Enter G654 in the search box to learn more about \"Learning About Meal Planning for Diabetes. \"  Current as of: September 8, 2021               Content Version: 13.2  © 7592-4094 Healthwise, Incorporated.    Care instructions adapted under license by [employer] (which disclaims liability or warranty for this information). If you have questions about a medical condition or this instruction, always ask your healthcare professional. [address] any warranty or liability for your use of this information.

## 2022-09-29 PROBLEM — K76.0 FATTY (CHANGE OF) LIVER, NOT ELSEWHERE CLASSIFIED: Status: RESOLVED | Noted: 2022-09-22 | Resolved: 2022-09-29

## 2022-09-29 PROBLEM — M94.0 COSTOCHONDRITIS: Status: RESOLVED | Noted: 2022-09-22 | Resolved: 2022-09-29

## 2022-09-29 PROBLEM — R10.13 EPIGASTRIC PAIN: Status: RESOLVED | Noted: 2022-09-22 | Resolved: 2022-09-29

## 2022-10-06 ENCOUNTER — TELEPHONE (OUTPATIENT)
Dept: INTERNAL MEDICINE CLINIC | Age: 62
End: 2022-10-06

## 2022-10-06 DIAGNOSIS — Z12.31 ENCOUNTER FOR SCREENING MAMMOGRAM FOR MALIGNANT NEOPLASM OF BREAST: Primary | ICD-10-CM

## 2022-10-06 DIAGNOSIS — Z12.31 ENCOUNTER FOR SCREENING MAMMOGRAM FOR MALIGNANT NEOPLASM OF BREAST: ICD-10-CM

## 2022-10-06 NOTE — TELEPHONE ENCOUNTER
----- Message from Micaela Bailey sent at 10/6/2022 10:44 AM EDT -----  Subject: Referral Request    Reason for referral request? mammogram order   Provider patient wants to be referred to(if known):     Provider Phone Number(if known): Additional Information for Provider? Patient would like an order for a   mammogram please call when ready.   ---------------------------------------------------------------------------  --------------  Loretta Forrester [facility]    [phone]; OK to leave message on voicemail  ---------------------------------------------------------------------------  --------------

## 2022-10-10 ENCOUNTER — HOSPITAL ENCOUNTER (OUTPATIENT)
Dept: MAMMOGRAPHY | Age: 62
Discharge: HOME | End: 2022-10-10
Attending: INTERNAL MEDICINE
Payer: OTHER GOVERNMENT

## 2022-10-10 PROCEDURE — 77063 BREAST TOMOSYNTHESIS BI: CPT

## 2022-10-14 ENCOUNTER — TELEPHONE (OUTPATIENT)
Dept: PHYSICAL THERAPY | Age: 62
End: 2022-10-14

## 2022-10-17 ENCOUNTER — TELEPHONE (OUTPATIENT)
Dept: INTERNAL MEDICINE CLINIC | Age: 62
End: 2022-10-17

## 2022-10-17 ENCOUNTER — APPOINTMENT (OUTPATIENT)
Dept: NUTRITION | Age: 62
End: 2022-10-17

## 2022-10-17 NOTE — TELEPHONE ENCOUNTER
----- Message from Teretha Fleischer, MD sent at 10/16/2022 11:29 PM EDT -----  Please let her know that there is no evidence for her lab work of tuberculosis. She is immune to hepatitis B, rubella, varicella, mumps, and rubeola for her recent labs. As result, she does not need any of these vaccinations. Meanwhile, her A1c is down to 6.4 from 7.7 three months ago. Her total cholesterol was 208. Her HDL was 36. Her triglycerides are 158. LDL is 140. Although her diabetes improved dramatically, her cholesterol has improved and medication is still warranted per her measurements. If she is agreeable to taking a cholesterol-lowering agent, please let me know so that I can order one. Please let her know that-no guidelines recommend that she take a cholesterol-lowering agent per her measures.     Dr. Briana Gilman  [facility] [address], [facility] [address]  Phone: [phone]  Fax: [phone]

## 2022-12-16 ENCOUNTER — APPOINTMENT (OUTPATIENT)
Dept: INTERNAL MEDICINE CLINIC | Age: 62
End: 2022-12-16

## 2022-12-16 ENCOUNTER — HOSPITAL ENCOUNTER (OUTPATIENT)
Dept: LAB | Age: 62
End: 2022-12-16
Payer: OTHER GOVERNMENT

## 2022-12-16 DIAGNOSIS — K75.81 NASH (NONALCOHOLIC STEATOHEPATITIS): ICD-10-CM

## 2022-12-16 DIAGNOSIS — E11.9 DIABETES MELLITUS TYPE 2, DIET-CONTROLLED (HCC): ICD-10-CM

## 2022-12-16 LAB
CHOLEST SERPL-MCNC: 208 MG/DL
EST. AVERAGE GLUCOSE BLD GHB EST-MCNC: 134 MG/DL
HBA1C MFR BLD: 6.3 % (ref 4.2–5.6)
HDLC SERPL-MCNC: 51 MG/DL (ref 40–60)
HDLC SERPL: 4.1 {RATIO} (ref 0–5)
LDLC SERPL CALC-MCNC: 140.6 MG/DL (ref 0–100)
LIPID PROFILE,FLP: ABNORMAL
TRIGL SERPL-MCNC: 82 MG/DL (ref ?–150)
VLDLC SERPL CALC-MCNC: 16.4 MG/DL

## 2022-12-16 PROCEDURE — 80061 LIPID PANEL: CPT

## 2022-12-16 PROCEDURE — 36415 COLL VENOUS BLD VENIPUNCTURE: CPT

## 2022-12-16 PROCEDURE — 83036 HEMOGLOBIN GLYCOSYLATED A1C: CPT

## 2022-12-19 ENCOUNTER — TELEPHONE (OUTPATIENT)
Dept: INTERNAL MEDICINE CLINIC | Age: 62
End: 2022-12-19

## 2022-12-19 NOTE — TELEPHONE ENCOUNTER
Received a call from [facility] lab about the specimens that were collected on Friday. Stating they only received 1 yellow top. The Megan Diaz will need to be recollected because that needs its own yellow top tube.

## 2022-12-21 ENCOUNTER — HOSPITAL ENCOUNTER (OUTPATIENT)
Dept: LAB | Age: 62
Discharge: HOME | End: 2022-12-21
Payer: OTHER GOVERNMENT

## 2022-12-21 ENCOUNTER — LAB ONLY (OUTPATIENT)
Dept: INTERNAL MEDICINE CLINIC | Age: 62
End: 2022-12-21

## 2022-12-21 DIAGNOSIS — K75.81 NASH (NONALCOHOLIC STEATOHEPATITIS): Primary | ICD-10-CM

## 2022-12-21 DIAGNOSIS — K75.81 NASH (NONALCOHOLIC STEATOHEPATITIS): ICD-10-CM

## 2022-12-21 PROCEDURE — 36415 COLL VENOUS BLD VENIPUNCTURE: CPT

## 2022-12-21 PROCEDURE — 82977 ASSAY OF GGT: CPT

## 2022-12-30 NOTE — PROGRESS NOTES
I will let her know at her upcoming apt that her total cholesterol is 208 and unchanged. Triglycerides are 82, down from 158. HDL is 51. LDL is 140, unchanged from 2 months ago. Her A1C is down to 6.3 from 6.4 in October.      Dr. Schwartz Apo  [facility], [address] [place] [address], [address]  Phone: [phone]  Fax: [phone]

## 2022-12-30 NOTE — PROGRESS NOTES
I will let her know at her upcoming appointment that her FibroSure score shows evidence of steatosis and borderline or probable BRAGG.      Dr. Murphy Watkins  [facility] [address], [address]  Phone: [phone]  Fax: [phone]

## 2023-01-06 ENCOUNTER — OFFICE VISIT (OUTPATIENT)
Dept: INTERNAL MEDICINE CLINIC | Age: 63
End: 2023-01-06
Payer: OTHER GOVERNMENT

## 2023-01-06 VITALS
RESPIRATION RATE: 18 BRPM | SYSTOLIC BLOOD PRESSURE: 135 MMHG | DIASTOLIC BLOOD PRESSURE: 77 MMHG | TEMPERATURE: 97.5 F | HEART RATE: 82 BPM | BODY MASS INDEX: 31.47 KG/M2 | OXYGEN SATURATION: 98 % | WEIGHT: 171 LBS | HEIGHT: 62 IN

## 2023-01-06 DIAGNOSIS — E78.2 MIXED HYPERLIPIDEMIA: Primary | ICD-10-CM

## 2023-01-06 DIAGNOSIS — K59.00 CONSTIPATION, UNSPECIFIED CONSTIPATION TYPE: ICD-10-CM

## 2023-01-06 DIAGNOSIS — E11.9 DIABETES MELLITUS TYPE 2, DIET-CONTROLLED (HCC): ICD-10-CM

## 2023-01-06 DIAGNOSIS — K75.81 NASH (NONALCOHOLIC STEATOHEPATITIS): ICD-10-CM

## 2023-01-06 RX ORDER — PRAVASTATIN SODIUM 40 MG/1
40 TABLET ORAL
Qty: 30 TABLET | Refills: 11 | Status: SHIPPED | OUTPATIENT
Start: 2023-01-06

## 2023-01-06 NOTE — PATIENT INSTRUCTIONS
Body Mass Index: Care Instructions  Your Care Instructions     Body mass index (BMI) can help you see if your weight is raising your risk for health problems. It uses a formula to compare how much you weigh with how tall you are. A BMI lower than 18.5 is considered underweight. A BMI between 18.5 and 24.9 is considered healthy. A BMI between 25 and 29.9 is considered overweight. A BMI of 30 or higher is considered obese. If your BMI is in the normal range, it means that you have a lower risk for weight-related health problems. If your BMI is in the overweight or obese range, you may be at increased risk for weight-related health problems, such as high blood pressure, heart disease, stroke, arthritis or joint pain, and diabetes. If your BMI is in the underweight range, you may be at increased risk for health problems such as fatigue, lower protection (immunity) against illness, muscle loss, bone loss, hair loss, and hormone problems. BMI is just one measure of your risk for weight-related health problems. You may be at higher risk for health problems if you are not active, you eat an unhealthy diet, or you drink too much alcohol or use tobacco products. Follow-up care is a key part of your treatment and safety. Be sure to make and go to all appointments, and call your doctor if you are having problems. It's also a good idea to know your test results and keep a list of the medicines you take. How can you care for yourself at home? Practice healthy eating habits. This includes eating plenty of fruits, vegetables, whole grains, lean protein, and low-fat dairy. If your doctor recommends it, get more exercise. Walking is a good choice. Bit by bit, increase the amount you walk every day. Try for at least 30 minutes on most days of the week. Do not smoke. Smoking can increase your risk for health problems. If you need help quitting, talk to your doctor about stop-smoking programs and medicines.  These can increase your chances of quitting for good. Limit alcohol to 2 drinks a day for men and 1 drink a day for women. Too much alcohol can cause health problems. If you have a BMI higher than 25  Your doctor may do other tests to check your risk for weight-related health problems. This may include measuring the distance around your waist. A waist measurement of more than 40 inches in men or 35 inches in women can increase the risk of weight-related health problems. Talk with your doctor about steps you can take to stay healthy or improve your health. You may need to make lifestyle changes to lose weight and stay healthy, such as changing your diet and getting regular exercise. If you have a BMI lower than 18.5  Your doctor may do other tests to check your risk for health problems. Talk with your doctor about steps you can take to stay healthy or improve your health. You may need to make lifestyle changes to gain or maintain weight and stay healthy, such as getting more healthy foods in your diet and doing exercises to build muscle. Where can you learn more? Go to http://www.ashton.com/  Enter S176 in the search box to learn more about \"Body Mass Index: Care Instructions. \"  Current as of: December 27, 2021               Content Version: 13.4  © 8986-7374 Healthwise, Incorporated. Care instructions adapted under license by [employer] (which disclaims liability or warranty for this information). If you have questions about a medical condition or this instruction, always ask your healthcare professional. [place] [address] any warranty or liability for your use of this information. Learning About Tests When You Have Diabetes  Why do you need regular tests? Diabetes can lead to other health problems if it's not well managed.  You'll need tests to monitor how well your diabetes is managed and to check for other things like high cholesterol or kidney problems. Having tests on a regular schedule can help your doctor find problems early, when it's best to start treating them. What tests do you need? These are the tests you may need and how often you should have them. The tests may vary depending on whether you have type 1 or type 2 diabetes. A1c blood test.  This test shows the average level of blood sugar over the past 2 to 3 months. It helps your doctor see whether blood sugar levels have been staying within your target range. How often: Every 3 to 6 months  Goal: A blood sugar level in your target range  Blood pressure test.  This test measures the pressure of blood flow in the arteries. Controlling blood pressure can help prevent damage to nerves and blood vessels. How often: Every 3 to 6 months  Goal: A blood pressure level in your target range  Cholesterol test.  This test measures the amount of a type of fat in the blood. It is common for people with diabetes to also have high cholesterol. Too much cholesterol in the blood can build up inside the blood vessels and raise the risk for heart attack and stroke. How often: At the time of your diabetes diagnosis, and as often as your doctor recommends after that  Goal: A cholesterol level in your target range  Albumin-creatinine ratio test.  This test checks for kidney damage by looking for the protein albumin (say \"al-BYOO-erendira\") in the urine. Albumin is normally found in the blood. Kidney damage can let small amounts of it (microalbumin) leak into the urine. How often: Once a year  Goal: No protein in the urine  Blood creatinine test/estimated glomerular filtration (eGFR). The blood creatinine (say \"opmh-XJ-yp-neen\") level shows how well your kidneys are working. Creatinine is a waste product that muscles release into the blood. Blood creatinine is used to estimate the glomerular filtration rate.  A high level of creatinine and/or a low eGFR may mean your kidneys are not working as well as they should. How often: Once a year  Goal: Normal level of creatinine in the blood. The eGFR goal is greater than 60 mL/min/1.73 m². Complete foot exam.  The doctor checks for foot sores and whether any sensation has been lost.  How often: Once a year  Goal: Healthy feet with no foot ulcers or loss of feeling  Dental exam and cleaning. The dentist checks for gum disease and tooth decay. People with high blood sugar are more likely to have these problems. How often: Every 6 months  Goal: Healthy teeth and gums  Complete eye exam.  High blood sugar levels can damage the eyes. This exam is done by an ophthalmologist or optometrist. It includes a dilated eye exam. The exam shows whether there's damage to the back of the eye (diabetic retinopathy). How often: Once a year. If you don't have any signs of diabetic retinopathy, your doctor may recommend an exam every 2 years. Goal: No damage to the back of the eye  Thyroid-stimulating hormone (TSH) blood test.  This test checks for thyroid disease, which is especially common for people with type 1 diabetes. Having too little or too much thyroid hormone can make it hard to manage your blood sugar. How often: As part of your diabetes diagnosis, and as often as your doctor recommends after that  Goal: Normal level of TSH in the blood  Follow-up care is a key part of your treatment and safety. Be sure to make and go to all appointments, and call your doctor if you are having problems. It's also a good idea to know your test results and keep a list of the medicines you take. Where can you learn more? Go to http://www.gray.com/  Enter A243 in the search box to learn more about \"Learning About Tests When You Have Diabetes. \"  Current as of: April 13, 2022               Content Version: 13.4  © 0282-1540 Healthwise, [employer].    Care instructions adapted under license by [employer] (which disclaims liability or warranty for this information). If you have questions about a medical condition or this instruction, always ask your healthcare professional. [address] any warranty or liability for your use of this information. Learning About Meal Planning for Diabetes  Why plan your meals? Meal planning can be a key part of managing diabetes. Planning meals and snacks with the right balance of carbohydrate, protein, and fat can help you keep your blood sugar at the target level you set with your doctor. You don't have to eat special foods. You can eat what your family eats, including sweets once in a while. But you do have to pay attention to how often you eat and how much you eat of certain foods. You may want to work with a dietitian or a diabetes educator. They can give you tips and meal ideas and can answer your questions about meal planning. This health professional can also help you reach a healthy weight if that is one of your goals. What plan is right for you? Your dietitian or diabetes educator may suggest that you start with the plate format or carbohydrate counting. The plate format  The plate format is a simple way to help you manage how you eat. You plan meals by learning how much space each food should take on a plate. Using the plate format helps you manage the amount of carbohydrate you eat. It can make it easier to keep your blood sugar level within your target range. It also helps you see if you're eating healthy portion sizes. To use the plate format, you put non-starchy vegetables on half your plate. Add lean protein foods, such as fish, lean meats and poultry, or soy products, on one-quarter of the plate. Put a grain or starchy vegetable (such as brown rice or a potato) on the final quarter of the plate.  You can add a small piece of fruit and some low-fat or fat-free milk or yogurt, depending on your carbohydrate goal for each meal.  Here are some tips for using the plate format:  Make sure that you are not using an oversized plate. A 9-inch plate is best. Many restaurants use larger plates. Get used to using the plate format at home. Then you can use it when you eat out. Write down your questions about using the plate format. Talk to your doctor, a dietitian, or a diabetes educator about your concerns. Carbohydrate counting  With carbohydrate counting, you plan meals based on the amount of carbohydrate in each food. Carbohydrate raises blood sugar higher and more quickly than any other nutrient. It is found in desserts, breads and cereals, and fruit. It's also found in starchy vegetables such as potatoes and corn, grains such as rice and pasta, and milk and yogurt. You can help keep your blood sugar levels within your target range by planning how much carbohydrate to have at meals and snacks. The amount you need depends on several things. These include your weight, how active you are, which diabetes medicines you take, and what your goals are for your blood sugar levels. A registered dietitian or diabetes educator can help you plan how much carbohydrate to include in each meal and snack. An example of a carbohydrate counting plan is:  45 to 60 grams at each meal. That's about the same as 3 to 4 carbohydrate servings. 15 to 20 grams at each snack. That's about the same as 1 carbohydrate serving. The Nutrition Facts label on packaged foods tells you how much carbohydrate is in a serving of the food. First, look at the serving size on the food label. Is that the amount you eat in a serving? All of the nutrition information on a food label is based on that serving size. So if you eat more or less than that, you'll need to adjust the other numbers. Total carbohydrate is the next thing you need to look for on the label. If you count carbohydrate servings, one serving of carbohydrate is 15 grams.   For foods that don't come with labels, such as fresh fruits and vegetables, you'll need a guide that lists carbohydrate in these foods. Ask your doctor, dietitian, or diabetes educator about books or other nutrition guides you can use. If you take insulin, you need to know how many grams of carbohydrate are in a meal. This lets you know how much rapid-acting insulin to take before you eat. If you use an insulin pump, you get a constant rate of insulin during the day. So the pump must be programmed at meals to give you extra insulin to cover the rise in blood sugar after meals. When you know how much carbohydrate you will eat, you can take the right amount of insulin. Or, if you always use the same amount of insulin, you need to make sure that you eat the same amount of carbohydrate at meals. If you need more help to understand carbohydrate counting and food labels, ask your doctor, dietitian, or diabetes educator. How can you plan healthy meals? Here are some tips to get started:  Plan your meals a week at a time. Don't forget to include snacks too. Use cookbooks or online recipes to plan several main meals. Plan some quick meals for busy nights. You also can double some recipes that freeze well. Then you can save half for other busy nights when you don't have time to cook. Make sure you have the ingredients you need for your recipes. If you're running low on basic items, put these items on your shopping list too. List foods that you use to make breakfasts, lunches, and snacks. List plenty of fruits and vegetables. Post this list on the refrigerator. Add to it as you think of more things you need. Take the list to the store to do your weekly shopping. Follow-up care is a key part of your treatment and safety. Be sure to make and go to all appointments, and call your doctor if you are having problems. It's also a good idea to know your test results and keep a list of the medicines you take. Where can you learn more?   Go to http://www.gray.com/  Enter [id number] in the search box to learn more about \"Learning About Meal Planning for Diabetes. \"  Current as of: October 6, 2021               Content Version: 13.4  © 1274-1308 Healthwise, Incorporated. Care instructions adapted under license by [employer] (which disclaims liability or warranty for this information). If you have questions about a medical condition or this instruction, always ask your healthcare professional. [place] [address] any warranty or liability for your use of this information.

## 2023-01-06 NOTE — PROGRESS NOTES
INTERNISTS OF [facility]:  1/6/2023, MRN: [id number]      Edy Santos is a 63 y.o. female and presents to clinic for Follow-up, Labs (12-21-22), and Constipation (Requesting refill of linzess)      Subjective: The patient is a 82-year-old female with history of eczema, vitiligo, genital herpes, type 2 diabetes, HLD, abnormal Pap smear, and BRAGG. 1. Type 2 DM: Diet controlled per pt preference. She has made aggressive dietary changes. Her A1C was 7.7 when last checked. Her A1C was 6.3 in December! Last eye exam: \"I think I did\" have the eye exam.      2. NAFLD/HLD: Her fibrosure test result is c/w persistent NAFLD and ?BRAGG. No ETOH. Her LDL is 140 and unchanged. +Family h/o  CVD and HLD. Her brother had 2 MIs. Her father had PAD. Another brother had CAD.    8/8/22 Heart Scan: Marked fatty infiltration of the liver. Coronary calcium score 139.    3. Constipation: Well controlled with linzess - when she takes it. She wants a refill. She has BRBPR with straining from \"hemorrhoids. \"        ***1. RUQ Pain: Reported at her last appointment. Triggered by consuming tomato products. Triggers also include other intake of certain foods. Pain was intermittent but did not radiate. Taking Linzess caused more pain. She denied urinary and vaginal symptoms. No hematuria, hematochezia, vaginal bleeding, or melena. Since her last appointment, she had an unremarkable HIDA scan. Her abdominal pain resolved. 8/5/22 HIDA Scan: No diagnostic abnormality.       Patient Active Problem List    Diagnosis Date Noted    Arthralgia of shoulder 09/22/2022    Allergic rhinitis 09/22/2022    Borderline glaucoma, open angle with borderline findings 09/22/2022    Chronic constipation 09/22/2022    Cortical, lamellar, or zonular cataract, nonsenile 09/22/2022    Obesity 09/22/2022    Presbyopia 09/22/2022    Regular astigmatism 09/22/2022    Diabetes mellitus type 2, diet-controlled ([facility] [address] 11/09/2021    Gastritis 07/10/2020 Mixed hyperlipidemia 07/15/2019    History of abnormal cervical Pap smear 07/15/2019    Eczema 05/31/2018    Vitiligo 05/31/2018    BRAGG (nonalcoholic steatohepatitis) 05/31/2018    Genital herpes 05/31/2018       Current Outpatient Medications   Medication Sig Dispense Refill    linaCLOtide (LINZESS) 72 mcg cap capsule Take 1 Capsule by mouth Daily (before breakfast). 90 Capsule 3    omeprazole (PRILOSEC) 20 mg capsule Take 1 Cap by mouth daily. 90 Cap 3    ibuprofen (MOTRIN) 600 mg tablet Take  by mouth every six (6) hours as needed for Pain. calcium carbonate (TUMS PO) Take  by mouth daily as needed. Allergies   Allergen Reactions    Linzess [Linaclotide] Other (comments)     Abdominal pain       Past Medical History:   Diagnosis Date    Fatty liver     GERD (gastroesophageal reflux disease)        No past surgical history on file.     Family History   Problem Relation Age of Onset    Hypertension Mother     Heart Disease Mother     Diabetes Father     Stroke Father     Heart Attack Father     Diabetes Sister     Diabetes Brother     Heart Attack Brother        Social History     Tobacco Use    Smoking status: Never    Smokeless tobacco: Never   Substance Use Topics    Alcohol use: No       ROS   ROS    Objective     Vitals:    01/06/23 1039 01/06/23 1044   BP: (!) 141/84 135/77   Pulse: 82    Resp: 18    Temp: 97.5 °F (36.4 °C)    TempSrc: Temporal    SpO2: 98%    Weight: 171 lb (77.6 kg)    Height: 5' 2\" (1.575 m)    PainSc:   0 - No pain        Physical Exam    LABS   Data Review:   Lab Results   Component Value Date/Time    WBC 4.9 07/14/2021 09:42 AM    HGB 12.6 07/14/2021 09:42 AM    HCT 38.2 07/14/2021 09:42 AM    PLATELET 789 18/76/2195 09:42 AM    MCV 96.0 07/14/2021 09:42 AM       Lab Results   Component Value Date/Time    Sodium 140 07/08/2022 08:40 AM    Potassium 4.5 07/08/2022 08:40 AM    Chloride 105 07/08/2022 08:40 AM    CO2 28 07/08/2022 08:40 AM    Anion gap 7 07/08/2022 08:40 AM Glucose 169 (H) 2022 09:48 AM    BUN 8 2022 08:40 AM    Creatinine 0.88 2022 08:40 AM    BUN/Creatinine ratio 9 (L) 2022 08:40 AM    GFR est AA >60 2022 08:40 AM    GFR est non-AA >60 2022 08:40 AM    Calcium 9.3 2022 08:40 AM       Lab Results   Component Value Date/Time    Cholesterol, total 218 (H) 2022 09:48 AM    HDL Cholesterol 51 2022 09:58 AM    LDL, calculated 140.6 (H) 2022 09:58 AM    VLDL, calculated 16.4 2022 09:58 AM    Triglyceride 150 (H) 2022 09:48 AM    CHOL/HDL Ratio 4.1 2022 09:58 AM       Lab Results   Component Value Date/Time    Hemoglobin A1c 6.3 (H) 2022 09:58 AM    Hemoglobin A1c, External 5.7 2018 12:00 AM       Assessment/Plan:   There are no diagnoses linked to this encounter. Health Maintenance Due   Topic Date Due    Pneumococcal 0-64 years (1 - PCV) Never done    Foot Exam Q1  Never done    Eye Exam Retinal or Dilated  Never done    Cervical cancer screen  Never done    COVID-19 Vaccine (3 - Booster for Pfizer series) 2021         Lab review: {lab reviewed:357229}    I have discussed the diagnosis with the patient and the intended plan as seen in the above orders. The patient has received an after-visit summary and questions were answered concerning future plans. I have discussed medication side effects and warnings with the patient as well. I have reviewed the plan of care with the patient, accepted their input and they are in agreement with the treatment goals. All questions were answered. The patient understands the plan of care. Handouts provided today with above information. ***Pt instructed if symptoms worsen to call the office or report to the ED for continued care. ***Greater than 50% of the visit time was spent in counseling and/or coordination of care.   ***The patient was counseled on the dangers of tobacco use, and was {TOMMY SMOKING CESSATION COUNSELIN::\"advised to quit\"}. Reviewed strategies to maximize success, including {techniques:20091}. 3-10 minutes were spent on smoking/tobacco cessation    Voice recognition was used to generate this report, which may have resulted in some phonetic based errors in grammar and contents. Even though attempts were made to correct all the mistakes, some may have been missed, and remained in the body of the document.           Ludy Torres MD labs are reviewed in the EHR and ordered as mentioned above. I have discussed the diagnosis with the patient and the intended plan as seen in the above orders. The patient has received an after-visit summary and questions were answered concerning future plans. I have discussed medication side effects and warnings with the patient as well. I have reviewed the plan of care with the patient, accepted their input and they are in agreement with the treatment goals. All questions were answered. The patient understands the plan of care. Handouts provided today with above information. Pt instructed if symptoms worsen to call the office or report to the ED for continued care. Greater than 50% of the visit time was spent in counseling and/or coordination of care. Voice recognition was used to generate this report, which may have resulted in some phonetic based errors in grammar and contents. Even though attempts were made to correct all the mistakes, some may have been missed, and remained in the body of the document.           Misbah Piña MD

## 2023-01-06 NOTE — PROGRESS NOTES
Sherly Lopez presents today for   Chief Complaint   Patient presents with    Follow-up    Labs     12-21-22    Constipation     Requesting refill of linzess       1. \"Have you been to the ER, urgent care clinic since your last visit? Hospitalized since your last visit? \" no    2. \"Have you seen or consulted any other health care providers outside of the [address] [place] [address] since your last visit? \" yes     3. For patients aged 39-70: Has the patient had a colonoscopy / FIT/ Cologuard? Yes - no Care Gap present      If the patient is female:    4. For patients aged 41-77: Has the patient had a mammogram within the past 2 years? Yes - no Care Gap present  See top three    5. For patients aged 21-65: Has the patient had a pap smear?  No

## 2023-01-12 ENCOUNTER — DOCUMENTATION ONLY (OUTPATIENT)
Dept: INTERNAL MEDICINE CLINIC | Age: 63
End: 2023-01-12

## 2023-01-15 PROBLEM — M25.519 ARTHRALGIA OF SHOULDER: Status: RESOLVED | Noted: 2022-09-22 | Resolved: 2023-01-15

## 2023-01-15 PROBLEM — H52.4 PRESBYOPIA: Status: RESOLVED | Noted: 2022-09-22 | Resolved: 2023-01-15

## 2023-01-15 PROBLEM — H26.019 CORTICAL, LAMELLAR, OR ZONULAR CATARACT, NONSENILE: Status: RESOLVED | Noted: 2022-09-22 | Resolved: 2023-01-15

## 2023-01-15 PROBLEM — H52.229 REGULAR ASTIGMATISM: Status: RESOLVED | Noted: 2022-09-22 | Resolved: 2023-01-15

## 2023-02-04 DIAGNOSIS — E11.9 DIABETES MELLITUS TYPE 2, DIET-CONTROLLED (HCC): Primary | ICD-10-CM

## 2023-02-05 DIAGNOSIS — E11.9 DIABETES MELLITUS TYPE 2, DIET-CONTROLLED (HCC): Primary | ICD-10-CM

## 2023-02-07 DIAGNOSIS — K75.81 NASH (NONALCOHOLIC STEATOHEPATITIS): Primary | ICD-10-CM

## 2023-02-07 DIAGNOSIS — E11.9 DIABETES MELLITUS TYPE 2, DIET-CONTROLLED (HCC): ICD-10-CM

## 2023-02-15 ENCOUNTER — TELEPHONE (OUTPATIENT)
Age: 63
End: 2023-02-15

## 2023-02-15 NOTE — TELEPHONE ENCOUNTER
Please have her scheduled for an appointment in office for evaluation in 2 weeks due to lack of available sooner appointments. If her symptoms are worsening, please have her go to an urgent care facility.     Dr. Rosemary Cordon  Internists of [address], [address] [place] [address] Str.  Phone: [phone]  Fax: [phone]

## 2023-02-15 NOTE — TELEPHONE ENCOUNTER
Patient stating her lips turned black a week ago. No swelling or pain. They feel dry and itchy. Wants to know if Dr. Suzanna Allan has any thoughts what the cause might be.

## 2023-05-05 ENCOUNTER — HOSPITAL ENCOUNTER (OUTPATIENT)
Facility: HOSPITAL | Age: 63
Setting detail: SPECIMEN
End: 2023-05-05
Payer: OTHER GOVERNMENT

## 2023-05-05 DIAGNOSIS — E11.9 DIABETES MELLITUS TYPE 2, DIET-CONTROLLED (HCC): ICD-10-CM

## 2023-05-05 DIAGNOSIS — K75.81 NASH (NONALCOHOLIC STEATOHEPATITIS): ICD-10-CM

## 2023-05-05 LAB
ALBUMIN SERPL-MCNC: 4.1 G/DL (ref 3.4–5)
ALBUMIN/GLOB SERPL: 1.1 (ref 0.8–1.7)
ALP SERPL-CCNC: 83 U/L (ref 45–117)
ALT SERPL-CCNC: 40 U/L (ref 13–56)
ANION GAP SERPL CALC-SCNC: 8 MMOL/L (ref 3–18)
AST SERPL-CCNC: 26 U/L (ref 10–38)
BILIRUB SERPL-MCNC: 0.8 MG/DL (ref 0.2–1)
BUN SERPL-MCNC: 10 MG/DL (ref 7–18)
BUN/CREAT SERPL: 11 (ref 12–20)
CALCIUM SERPL-MCNC: 9.5 MG/DL (ref 8.5–10.1)
CHLORIDE SERPL-SCNC: 105 MMOL/L (ref 100–111)
CHOLEST SERPL-MCNC: 175 MG/DL
CO2 SERPL-SCNC: 24 MMOL/L (ref 21–32)
CREAT SERPL-MCNC: 0.88 MG/DL (ref 0.6–1.3)
CREAT UR-MCNC: 189 MG/DL (ref 30–125)
EST. AVERAGE GLUCOSE BLD GHB EST-MCNC: 169 MG/DL
GLOBULIN SER CALC-MCNC: 3.6 G/DL (ref 2–4)
GLUCOSE SERPL-MCNC: 199 MG/DL (ref 74–99)
HBA1C MFR BLD: 7.5 % (ref 4.2–5.6)
HDLC SERPL-MCNC: 51 MG/DL (ref 40–60)
HDLC SERPL: 3.4 (ref 0–5)
LDLC SERPL CALC-MCNC: 102.2 MG/DL (ref 0–100)
LIPID PANEL: ABNORMAL
MICROALBUMIN UR-MCNC: 0.78 MG/DL (ref 0–3)
MICROALBUMIN/CREAT UR-RTO: 4 MG/G (ref 0–30)
POTASSIUM SERPL-SCNC: 4.3 MMOL/L (ref 3.5–5.5)
PROT SERPL-MCNC: 7.7 G/DL (ref 6.4–8.2)
SODIUM SERPL-SCNC: 137 MMOL/L (ref 136–145)
TRIGL SERPL-MCNC: 109 MG/DL
VLDLC SERPL CALC-MCNC: 21.8 MG/DL

## 2023-05-05 PROCEDURE — 82570 ASSAY OF URINE CREATININE: CPT

## 2023-05-05 PROCEDURE — 80061 LIPID PANEL: CPT

## 2023-05-05 PROCEDURE — 36415 COLL VENOUS BLD VENIPUNCTURE: CPT

## 2023-05-05 PROCEDURE — 80053 COMPREHEN METABOLIC PANEL: CPT

## 2023-05-05 PROCEDURE — 82043 UR ALBUMIN QUANTITATIVE: CPT

## 2023-05-05 PROCEDURE — 83036 HEMOGLOBIN GLYCOSYLATED A1C: CPT

## 2023-05-12 ENCOUNTER — OFFICE VISIT (OUTPATIENT)
Age: 63
End: 2023-05-12
Payer: OTHER GOVERNMENT

## 2023-05-12 VITALS
HEART RATE: 84 BPM | RESPIRATION RATE: 18 BRPM | TEMPERATURE: 97.3 F | HEIGHT: 62 IN | BODY MASS INDEX: 32.35 KG/M2 | WEIGHT: 175.8 LBS | OXYGEN SATURATION: 97 % | SYSTOLIC BLOOD PRESSURE: 124 MMHG | DIASTOLIC BLOOD PRESSURE: 83 MMHG

## 2023-05-12 DIAGNOSIS — E78.2 MIXED HYPERLIPIDEMIA: ICD-10-CM

## 2023-05-12 DIAGNOSIS — E11.9 TYPE 2 DIABETES MELLITUS WITHOUT COMPLICATION, UNSPECIFIED WHETHER LONG TERM INSULIN USE (HCC): Primary | ICD-10-CM

## 2023-05-12 PROCEDURE — 99214 OFFICE O/P EST MOD 30 MIN: CPT | Performed by: INTERNAL MEDICINE

## 2023-05-12 PROCEDURE — 3051F HG A1C>EQUAL 7.0%<8.0%: CPT | Performed by: INTERNAL MEDICINE

## 2023-05-12 SDOH — ECONOMIC STABILITY: FOOD INSECURITY: WITHIN THE PAST 12 MONTHS, YOU WORRIED THAT YOUR FOOD WOULD RUN OUT BEFORE YOU GOT MONEY TO BUY MORE.: NEVER TRUE

## 2023-05-12 SDOH — ECONOMIC STABILITY: FOOD INSECURITY: WITHIN THE PAST 12 MONTHS, THE FOOD YOU BOUGHT JUST DIDN'T LAST AND YOU DIDN'T HAVE MONEY TO GET MORE.: NEVER TRUE

## 2023-05-12 SDOH — ECONOMIC STABILITY: INCOME INSECURITY: HOW HARD IS IT FOR YOU TO PAY FOR THE VERY BASICS LIKE FOOD, HOUSING, MEDICAL CARE, AND HEATING?: NOT HARD AT ALL

## 2023-05-12 SDOH — ECONOMIC STABILITY: HOUSING INSECURITY
IN THE LAST 12 MONTHS, WAS THERE A TIME WHEN YOU DID NOT HAVE A STEADY PLACE TO SLEEP OR SLEPT IN A SHELTER (INCLUDING NOW)?: NO

## 2023-05-12 ASSESSMENT — PATIENT HEALTH QUESTIONNAIRE - PHQ9
3. TROUBLE FALLING OR STAYING ASLEEP: 0
10. IF YOU CHECKED OFF ANY PROBLEMS, HOW DIFFICULT HAVE THESE PROBLEMS MADE IT FOR YOU TO DO YOUR WORK, TAKE CARE OF THINGS AT HOME, OR GET ALONG WITH OTHER PEOPLE: 0
SUM OF ALL RESPONSES TO PHQ QUESTIONS 1-9: 0
5. POOR APPETITE OR OVEREATING: 0
1. LITTLE INTEREST OR PLEASURE IN DOING THINGS: 0
4. FEELING TIRED OR HAVING LITTLE ENERGY: 0
SUM OF ALL RESPONSES TO PHQ QUESTIONS 1-9: 0
2. FEELING DOWN, DEPRESSED OR HOPELESS: 0
6. FEELING BAD ABOUT YOURSELF - OR THAT YOU ARE A FAILURE OR HAVE LET YOURSELF OR YOUR FAMILY DOWN: 0
7. TROUBLE CONCENTRATING ON THINGS, SUCH AS READING THE NEWSPAPER OR WATCHING TELEVISION: 0
SUM OF ALL RESPONSES TO PHQ9 QUESTIONS 1 & 2: 0
9. THOUGHTS THAT YOU WOULD BE BETTER OFF DEAD, OR OF HURTING YOURSELF: 0
8. MOVING OR SPEAKING SO SLOWLY THAT OTHER PEOPLE COULD HAVE NOTICED. OR THE OPPOSITE, BEING SO FIGETY OR RESTLESS THAT YOU HAVE BEEN MOVING AROUND A LOT MORE THAN USUAL: 0

## 2023-05-21 ASSESSMENT — ENCOUNTER SYMPTOMS
COUGH: 0
EYE PAIN: 0
ANAL BLEEDING: 0
BLOOD IN STOOL: 0
ABDOMINAL PAIN: 0
SORE THROAT: 0
SHORTNESS OF BREATH: 0

## 2023-09-15 ENCOUNTER — HOSPITAL ENCOUNTER (OUTPATIENT)
Facility: HOSPITAL | Age: 63
Setting detail: SPECIMEN
End: 2023-09-15
Payer: OTHER GOVERNMENT

## 2023-09-15 DIAGNOSIS — E11.9 TYPE 2 DIABETES MELLITUS WITHOUT COMPLICATION, UNSPECIFIED WHETHER LONG TERM INSULIN USE (HCC): ICD-10-CM

## 2023-09-15 LAB
EST. AVERAGE GLUCOSE BLD GHB EST-MCNC: 217 MG/DL
HBA1C MFR BLD: 9.2 % (ref 4.2–5.6)

## 2023-09-15 PROCEDURE — 36415 COLL VENOUS BLD VENIPUNCTURE: CPT

## 2023-09-15 PROCEDURE — 83036 HEMOGLOBIN GLYCOSYLATED A1C: CPT

## 2023-09-22 ENCOUNTER — OFFICE VISIT (OUTPATIENT)
Age: 63
End: 2023-09-22
Payer: OTHER GOVERNMENT

## 2023-09-22 VITALS
DIASTOLIC BLOOD PRESSURE: 85 MMHG | WEIGHT: 169 LBS | HEART RATE: 97 BPM | HEIGHT: 62 IN | OXYGEN SATURATION: 99 % | BODY MASS INDEX: 31.1 KG/M2 | TEMPERATURE: 97.9 F | RESPIRATION RATE: 20 BRPM | SYSTOLIC BLOOD PRESSURE: 134 MMHG

## 2023-09-22 DIAGNOSIS — E11.9 TYPE 2 DIABETES MELLITUS WITHOUT COMPLICATION, UNSPECIFIED WHETHER LONG TERM INSULIN USE (HCC): Primary | ICD-10-CM

## 2023-09-22 DIAGNOSIS — R10.9 RIGHT SIDED ABDOMINAL PAIN: ICD-10-CM

## 2023-09-22 DIAGNOSIS — M54.50 CHRONIC RIGHT-SIDED LOW BACK PAIN WITHOUT SCIATICA: ICD-10-CM

## 2023-09-22 DIAGNOSIS — G89.29 CHRONIC RIGHT-SIDED LOW BACK PAIN WITHOUT SCIATICA: ICD-10-CM

## 2023-09-22 PROCEDURE — 99211 OFF/OP EST MAY X REQ PHY/QHP: CPT

## 2023-09-22 PROCEDURE — 3046F HEMOGLOBIN A1C LEVEL >9.0%: CPT | Performed by: INTERNAL MEDICINE

## 2023-09-22 PROCEDURE — 99214 OFFICE O/P EST MOD 30 MIN: CPT | Performed by: INTERNAL MEDICINE

## 2023-09-22 ASSESSMENT — PATIENT HEALTH QUESTIONNAIRE - PHQ9
SUM OF ALL RESPONSES TO PHQ9 QUESTIONS 1 & 2: 0
SUM OF ALL RESPONSES TO PHQ QUESTIONS 1-9: 0
2. FEELING DOWN, DEPRESSED OR HOPELESS: 0
SUM OF ALL RESPONSES TO PHQ QUESTIONS 1-9: 0
1. LITTLE INTEREST OR PLEASURE IN DOING THINGS: 0

## 2023-09-22 NOTE — PATIENT INSTRUCTIONS
Learning About Tests When You Have Diabetes  Why do you need regular tests? Diabetes can lead to other health problems if it's not well managed. You'll need tests to monitor how well your diabetes is managed and to check for other things like high cholesterol or kidney problems. Having tests on a regular schedule can help your doctor find problems early, when it's best to start treating them. What tests do you need? These are the tests you may need and how often you should have them. The tests may vary depending on whether you have type 1 or type 2 diabetes. A1c blood test.  This test shows the average level of blood sugar over the past 2 to 3 months. It helps your doctor see whether blood sugar levels have been staying within your target range. How often: Every 3 to 6 months  Goal: A blood sugar level in your target range  Blood pressure test.  This test measures the pressure of blood flow in the arteries. Controlling blood pressure can help prevent damage to nerves and blood vessels. How often: Every 3 to 6 months  Goal: A blood pressure level in your target range  Cholesterol test.  This test measures the amount of a type of fat in the blood. It is common for people with diabetes to also have high cholesterol. Too much cholesterol in the blood can build up inside the blood vessels and raise the risk for heart attack and stroke. How often: At the time of your diabetes diagnosis, and as often as your doctor recommends after that  Goal: A cholesterol level in your target range  Albumin-creatinine ratio test.  This test checks for kidney damage by looking for the protein albumin (say \"al-BYOO-adriana\") in the urine. Albumin is normally found in the blood. Kidney damage can let small amounts of it (microalbumin) leak into the urine. How often: Once a year  Goal: No protein in the urine  Blood creatinine test/estimated glomerular filtration (eGFR).   The blood creatinine (say \"pswa-CK-lh-neen\") level shows

## 2023-09-28 ASSESSMENT — ENCOUNTER SYMPTOMS
BLOOD IN STOOL: 0
SHORTNESS OF BREATH: 0
EYE PAIN: 0
COUGH: 0
SORE THROAT: 0
BACK PAIN: 1
ANAL BLEEDING: 0
ABDOMINAL PAIN: 1

## 2023-10-11 ENCOUNTER — HOSPITAL ENCOUNTER (OUTPATIENT)
Facility: HOSPITAL | Age: 63
Discharge: HOME | End: 2023-10-14
Payer: OTHER GOVERNMENT

## 2023-10-11 VITALS — WEIGHT: 169 LBS | BODY MASS INDEX: 31.1 KG/M2 | HEIGHT: 62 IN

## 2023-10-11 DIAGNOSIS — Z12.31 VISIT FOR SCREENING MAMMOGRAM: ICD-10-CM

## 2023-10-11 PROCEDURE — 77063 BREAST TOMOSYNTHESIS BI: CPT

## 2023-10-11 NOTE — PROGRESS NOTES
Pharmacy Progress Note - Diabetes Management       Assessment / Plan:   Diabetes Management:  Per ADA guidelines, Pt's A1c is not at goal of < 7%. Unable to assess her glycemic control without SMBG logs. Sent Rx for meter and supplies so that she can start monitoring her FBG daily. A CGM is not an economical option for her at the moment given that her phone is not compatible as a . Encouraged to cut back on sugared beverages primarily and to follow a reduced carb diet. She is not amenable to starting any medications at this time. Will revisit this at follow up. Will reassess with SMBG logs at follow up in 3 weeks. Hyperlipidemia:  The 10-year ASCVD risk score (Starr DK, et al., 2019) is: 13.5% based on parameters listed. Current lipid treatment guidelines recommend at least moderate-intensity statin doses for all patients with diabetes to decrease overall ASCVD risk. Patient currently qualifies for a moderate intensity statin therapy based on current recommendations and is currently taking a moderate intensity statin. Nutrition/Lifestyle Modifications:  - Educated pt on the importance of moderating carbohydrate intake. Reviewed sources of carbohydrates and method to help determine appropriate portion sizes (e.g., Diabetes Plate Method). - Advised patient to avoid sugar-sweetened beverages and replace with water or diet/zero sugar option.  - Recommend ~30 minutes consistent, moderately intensive, exercise/day or ~150 minutes/week. Start small, stay consistent, and increase length and types of exercise, as tolerated. Patient will return to clinic in 3 week(s) for follow up. S/O: Ms. Yeni Xavier, a 66 y.o. female referred by Tito Castellanos MD,  has a past medical history of Fatty liver and GERD (gastroesophageal reflux disease). Pt was seen today for diabetes management.   Patient's last A1c was:   Hemoglobin A1C   Date Value Ref Range Status   09/15/2023 9.2 (H) 4.2

## 2023-10-13 ENCOUNTER — PHARMACY VISIT (OUTPATIENT)
Age: 63
End: 2023-10-13

## 2023-10-13 DIAGNOSIS — E11.9 TYPE 2 DIABETES MELLITUS WITHOUT COMPLICATION, UNSPECIFIED WHETHER LONG TERM INSULIN USE (HCC): Primary | ICD-10-CM

## 2023-10-13 RX ORDER — PRAVASTATIN SODIUM 40 MG
40 TABLET ORAL NIGHTLY
COMMUNITY

## 2023-10-13 RX ORDER — LANCETS 28 GAUGE
1 EACH MISCELLANEOUS DAILY
Qty: 100 EACH | Refills: 3 | Status: SHIPPED | OUTPATIENT
Start: 2023-10-13

## 2023-10-13 RX ORDER — BLOOD-GLUCOSE METER
1 KIT MISCELLANEOUS DAILY
Qty: 100 EACH | Refills: 3 | Status: SHIPPED | OUTPATIENT
Start: 2023-10-13

## 2023-10-13 RX ORDER — BLOOD-GLUCOSE METER
1 KIT MISCELLANEOUS DAILY
Qty: 1 KIT | Refills: 0 | Status: SHIPPED | OUTPATIENT
Start: 2023-10-13

## 2023-10-13 NOTE — PATIENT INSTRUCTIONS
Your Visit Summary:     Plan:  - Check blood glucose every morning before eating or drinking anything. Call me with any questions or concerns  Braxton Malin [phone]    Check and document your blood sugar first thing in the morning (fasting at least 8 hours), 2 hours after a meal, and/or before bedtime. Bring your meter/log to all future visits. Your blood sugar goals:  - Fasting (first thing in the morning)  blood sugar: 80 - 130mg/dL  - 2 hours after a meal: 80 - 180mg/dL    When you experience symptoms of low blood sugar (example: less than 70):  - Confirm low reading by checking your blood sugar.   - Then treat with 15 grams of carbohydrates (one-half cup of juice or regular soda, or 4-5 glucose tablets). - Wait 15 minutes to recheck blood sugar.   - Then eat a protein containing meal/snack to prevent another low blood sugar episode. (example: peanut butter + crackers)    Nutrition:  - When reviewing a nutrition label, focus on the serving size, total calories, fat (and type of fats), total carbohydrates, sugar (and amount of added sugar), amount of fiber (good for your digestive), and amount of protein. Refer to your nutrition label guide for more information.  - For a meal : max 45 - 60 grams of carbohydrates  - For a snack: max 15 grams of carbohydrates  - Reduce amount of saturated and trans fat. Consider more unsaturated fat options as they are better for your heart health.    - Have at least 1 serving of lean fat protein with each meal.    - Increase fiber intake slowly to prevent constipation.   - Substitute fruit juices for the whole fruit    Low carb snack ideas (15 grams total carb or less):    String cheese or babybel with 6 crackers  4 peanut butter crackers  3 cups of popcorn  1 cup raw vegetables with hummus or ranch dip (just need to watch how much dip you use)  Nuts  2 rice cakes  Celery with peanut butter or cream cheese  String cheese with 1 serving of fruit  Greek yogurt (look

## 2023-10-30 NOTE — PROGRESS NOTES
for her at the moment given that her phone is not compatible as a . Encouraged to cut back on sugared beverages primarily and to follow a reduced carb diet. She is not amenable to starting any medications at this time. Will revisit this at follow up. Will reassess with SMBG logs at follow up in 3 weeks. Today:   Pt states that she has improved her diet. She has decreased her total carb intake and portion sizes are smaller. She has cut out the sugary beverages completely. She tries not to eat past 5:30pm.  Pt applauded for improving her diet to this degree. Pt educated on the possible need for insulin and the first step to avoid insulin will be to start a GLP-1RA. DOD formulary [id number] is Trulicity. SE gone over and pt verbalized understanding. She is amenable to a dose increase after her initial month. Trulicity Education:   - Single-use pen for each dose and is discarded after each use. - Used once weekly on the same day each week. - Injection sites - abdomen (at least 2\" from belly button), thigh, outer arm  - Store in the refrigerator and allow to come to room temperature before injection (about 10-15 minutes). - Discussed proper injection technique   Choose appropriate injection site and clean the area with alcohol wipe, allowing it to dry completely. Make sure the pen is in the locked position and remove the cap by pulling it straight off. Place the clear base firmly against the skin at appropriate injection site. Turn the pen to the unlocked position when ready to inject. Press and hold the button to begin injection; you will hear a click once the injection starts. Discard entire pen once injection is completed. - Educated on potential side effects and that medication may decrease appetite. Overeating or eating greasy foods/red meats can increase risk of GI side effects. Monitor for lumps/swelling/hoarseness in throat and severe abdominal pain.    - Discussed missed

## 2023-11-03 ENCOUNTER — PHARMACY VISIT (OUTPATIENT)
Age: 63
End: 2023-11-03

## 2023-11-03 ENCOUNTER — TELEPHONE (OUTPATIENT)
Age: 63
End: 2023-11-03

## 2023-11-03 DIAGNOSIS — E11.9 TYPE 2 DIABETES MELLITUS WITHOUT COMPLICATION, UNSPECIFIED WHETHER LONG TERM INSULIN USE (HCC): Primary | ICD-10-CM

## 2023-11-03 RX ORDER — DULAGLUTIDE 0.75 MG/.5ML
0.75 INJECTION, SOLUTION SUBCUTANEOUS WEEKLY
Qty: 2 ML | Refills: 0 | Status: SHIPPED | OUTPATIENT
Start: 2023-11-03

## 2023-11-03 RX ORDER — DULAGLUTIDE 1.5 MG/.5ML
1.5 INJECTION, SOLUTION SUBCUTANEOUS
Qty: 2 ML | Refills: 1 | Status: SHIPPED | OUTPATIENT
Start: 2023-11-17

## 2023-11-03 NOTE — TELEPHONE ENCOUNTER
Patient called and states the pharmacy told her the prescription for Trulicity needs a prior auth. Patient can be reached at [phone]. Please advise, thank you.

## 2023-11-14 ENCOUNTER — HOSPITAL ENCOUNTER (OUTPATIENT)
Facility: HOSPITAL | Age: 63
Setting detail: SPECIMEN
Discharge: HOME | End: 2023-11-17
Payer: OTHER GOVERNMENT

## 2023-11-14 DIAGNOSIS — R10.9 RIGHT SIDED ABDOMINAL PAIN: ICD-10-CM

## 2023-11-14 LAB
ALBUMIN SERPL-MCNC: 4.1 G/DL (ref 3.4–5)
ALBUMIN/GLOB SERPL: 1.1 (ref 0.8–1.7)
ALP SERPL-CCNC: 102 U/L (ref 45–117)
ALT SERPL-CCNC: 39 U/L (ref 13–56)
ANION GAP SERPL CALC-SCNC: 6 MMOL/L (ref 3–18)
APPEARANCE UR: CLEAR
AST SERPL-CCNC: 25 U/L (ref 10–38)
BACTERIA URNS QL MICRO: ABNORMAL /HPF
BASOPHILS # BLD: 0 K/UL (ref 0–0.1)
BASOPHILS NFR BLD: 1 % (ref 0–2)
BILIRUB SERPL-MCNC: 0.8 MG/DL (ref 0.2–1)
BILIRUB UR QL: NEGATIVE
BUN SERPL-MCNC: 10 MG/DL (ref 7–18)
BUN/CREAT SERPL: 11 (ref 12–20)
CALCIUM SERPL-MCNC: 9.8 MG/DL (ref 8.5–10.1)
CHLORIDE SERPL-SCNC: 103 MMOL/L (ref 100–111)
CO2 SERPL-SCNC: 28 MMOL/L (ref 21–32)
COLOR UR: YELLOW
CREAT SERPL-MCNC: 0.95 MG/DL (ref 0.6–1.3)
DIFFERENTIAL METHOD BLD: ABNORMAL
EOSINOPHIL # BLD: 0.1 K/UL (ref 0–0.4)
EOSINOPHIL NFR BLD: 1 % (ref 0–5)
EPITH CASTS URNS QL MICRO: ABNORMAL /LPF (ref 0–5)
ERYTHROCYTE [DISTWIDTH] IN BLOOD BY AUTOMATED COUNT: 11.9 % (ref 11.6–14.5)
GLOBULIN SER CALC-MCNC: 3.9 G/DL (ref 2–4)
GLUCOSE SERPL-MCNC: 241 MG/DL (ref 74–99)
GLUCOSE UR STRIP.AUTO-MCNC: 100 MG/DL
HCT VFR BLD AUTO: 40 % (ref 35–45)
HGB BLD-MCNC: 13.3 G/DL (ref 12–16)
HGB UR QL STRIP: NEGATIVE
IMM GRANULOCYTES # BLD AUTO: 0 K/UL (ref 0–0.04)
IMM GRANULOCYTES NFR BLD AUTO: 0 % (ref 0–0.5)
KETONES UR QL STRIP.AUTO: NEGATIVE MG/DL
LEUKOCYTE ESTERASE UR QL STRIP.AUTO: NEGATIVE
LYMPHOCYTES # BLD: 3.2 K/UL (ref 0.9–3.6)
LYMPHOCYTES NFR BLD: 52 % (ref 21–52)
MCH RBC QN AUTO: 31.9 PG (ref 24–34)
MCHC RBC AUTO-ENTMCNC: 33.3 G/DL (ref 31–37)
MCV RBC AUTO: 95.9 FL (ref 78–100)
MONOCYTES # BLD: 0.3 K/UL (ref 0.05–1.2)
MONOCYTES NFR BLD: 5 % (ref 3–10)
NEUTS SEG # BLD: 2.6 K/UL (ref 1.8–8)
NEUTS SEG NFR BLD: 42 % (ref 40–73)
NITRITE UR QL STRIP.AUTO: NEGATIVE
NRBC # BLD: 0 K/UL (ref 0–0.01)
NRBC BLD-RTO: 0 PER 100 WBC
PH UR STRIP: 5 (ref 5–8)
PLATELET # BLD AUTO: 208 K/UL (ref 135–420)
PMV BLD AUTO: 12.9 FL (ref 9.2–11.8)
POTASSIUM SERPL-SCNC: 4.2 MMOL/L (ref 3.5–5.5)
PROT SERPL-MCNC: 8 G/DL (ref 6.4–8.2)
PROT UR STRIP-MCNC: NEGATIVE MG/DL
RBC # BLD AUTO: 4.17 M/UL (ref 4.2–5.3)
RBC #/AREA URNS HPF: ABNORMAL /HPF (ref 0–5)
SODIUM SERPL-SCNC: 137 MMOL/L (ref 136–145)
SP GR UR REFRACTOMETRY: 1.02 (ref 1–1.03)
UROBILINOGEN UR QL STRIP.AUTO: 0.2 EU/DL (ref 0.2–1)
WBC # BLD AUTO: 6.2 K/UL (ref 4.6–13.2)
WBC URNS QL MICRO: ABNORMAL /HPF (ref 0–4)

## 2023-11-14 PROCEDURE — 81001 URINALYSIS AUTO W/SCOPE: CPT

## 2023-11-14 PROCEDURE — 85025 COMPLETE CBC W/AUTO DIFF WBC: CPT

## 2023-11-14 PROCEDURE — 80053 COMPREHEN METABOLIC PANEL: CPT

## 2023-11-14 PROCEDURE — 36415 COLL VENOUS BLD VENIPUNCTURE: CPT

## 2023-11-21 ENCOUNTER — OFFICE VISIT (OUTPATIENT)
Age: 63
End: 2023-11-21
Payer: OTHER GOVERNMENT

## 2023-11-21 VITALS
TEMPERATURE: 97.3 F | HEART RATE: 72 BPM | DIASTOLIC BLOOD PRESSURE: 77 MMHG | HEIGHT: 62 IN | OXYGEN SATURATION: 99 % | SYSTOLIC BLOOD PRESSURE: 117 MMHG | RESPIRATION RATE: 16 BRPM | BODY MASS INDEX: 30.55 KG/M2 | WEIGHT: 166 LBS

## 2023-11-21 DIAGNOSIS — E11.9 TYPE 2 DIABETES MELLITUS WITHOUT COMPLICATION, UNSPECIFIED WHETHER LONG TERM INSULIN USE (HCC): Primary | ICD-10-CM

## 2023-11-21 DIAGNOSIS — R10.9 RIGHT SIDED ABDOMINAL PAIN: ICD-10-CM

## 2023-11-21 PROCEDURE — 3046F HEMOGLOBIN A1C LEVEL >9.0%: CPT | Performed by: INTERNAL MEDICINE

## 2023-11-21 PROCEDURE — 99214 OFFICE O/P EST MOD 30 MIN: CPT | Performed by: INTERNAL MEDICINE

## 2023-11-21 ASSESSMENT — PATIENT HEALTH QUESTIONNAIRE - PHQ9
SUM OF ALL RESPONSES TO PHQ QUESTIONS 1-9: 0
2. FEELING DOWN, DEPRESSED OR HOPELESS: 0
SUM OF ALL RESPONSES TO PHQ9 QUESTIONS 1 & 2: 0
1. LITTLE INTEREST OR PLEASURE IN DOING THINGS: 0
SUM OF ALL RESPONSES TO PHQ QUESTIONS 1-9: 0

## 2023-11-21 NOTE — PROGRESS NOTES
INTERNISTS OF [facility]:  11/27/2023, MRN: [id number]      Gloria Muhammad is a 66 y.o. female and presents to clinic for Follow-up and Diabetes      Subjective: The patient is a 63-year-old female with history of eczema, vitiligo, genital herpes, type 2 diabetes, HLD, abnormal Pap smear, hemorrhoids, and BESS. 1.  Type 2 diabetes: Diet controlled. In the past, she declined medication. At her last appointment, I encouraged her to get a formal eye exam.  She was also referred to our clinical pharmacy team for a sample CGM trial to help guide pharmacotherapy options in her diet. She was hesitant to try medication. November labs show that her creatinine is normal at 0.95. Her electrolytes are normal.  Her blood glucose at that time was 241. Her LFTs are normal.  Her A1c in September was 9.2. Since her last visit, she met with our clinical pharmacy team and was started on Trulicity 1.87 mg weekly for 4 weeks followed by 1.5 mg weekly thereafter. She is not on a cholesterol-lowering medication. She is taking pravastatin 40 mg nightly. Eye exam: scheduled for January  Blood sugars: she checks FBGs daily - her Bgs are in the 200s from the 300s range. 2.  Right-sided abdominal pain: Reported at her last appointment and present over the past year. Symptoms were described as intermittent without any triggers noted. Today she reports: her abdominal pain and back pain resolved.          Patient Active Problem List    Diagnosis Date Noted    Allergic rhinitis 09/22/2022    Borderline glaucoma, open angle with borderline findings 09/22/2022    Chronic constipation 09/22/2022    Obesity 09/22/2022    Diabetes mellitus type 2, diet-controlled ([address]) 11/09/2021    Gastritis 07/10/2020    History of abnormal cervical Pap smear 07/15/2019    Mixed hyperlipidemia 07/15/2019    Genital herpes 05/31/2018    BESS (nonalcoholic steatohepatitis) 05/31/2018    Eczema 05/31/2018    Vitiligo 05/31/2018       Current

## 2023-11-21 NOTE — PATIENT INSTRUCTIONS
Learning About Tests When You Have Diabetes  Why do you need regular tests? Diabetes can lead to other health problems if it's not well managed. You'll need tests to monitor how well your diabetes is managed and to check for other things like high cholesterol or kidney problems. Having tests on a regular schedule can help your doctor find problems early, when it's best to start treating them. What tests do you need? These are the tests you may need and how often you should have them. The tests may vary depending on whether you have type 1 or type 2 diabetes. A1c blood test.  This test shows the average level of blood sugar over the past 2 to 3 months. It helps your doctor see whether blood sugar levels have been staying within your target range. How often: Every 3 to 6 months  Goal: A blood sugar level in your target range  Blood pressure test.  This test measures the pressure of blood flow in the arteries. Controlling blood pressure can help prevent damage to nerves and blood vessels. How often: Every 3 to 6 months  Goal: A blood pressure level in your target range  Cholesterol test.  This test measures the amount of a type of fat in the blood. It is common for people with diabetes to also have high cholesterol. Too much cholesterol in the blood can build up inside the blood vessels and raise the risk for heart attack and stroke. How often: At the time of your diabetes diagnosis, and as often as your doctor recommends after that  Goal: A cholesterol level in your target range  Albumin-creatinine ratio test.  This test checks for kidney damage by looking for the protein albumin (say \"al-BYOO-adriana\") in the urine. Albumin is normally found in the blood. Kidney damage can let small amounts of it (microalbumin) leak into the urine. How often: Once a year  Goal: No protein in the urine  Blood creatinine test/estimated glomerular filtration (eGFR).   The blood creatinine (say \"rbob-JW-qs-neen\") level shows

## 2023-11-22 ENCOUNTER — TELEPHONE (OUTPATIENT)
Age: 63
End: 2023-11-22

## 2023-11-22 NOTE — TELEPHONE ENCOUNTER
Patient called stating that  need prior approve for the trucility.   [facility] number [phone]

## 2023-11-27 ASSESSMENT — ENCOUNTER SYMPTOMS
COUGH: 0
BLOOD IN STOOL: 0
SHORTNESS OF BREATH: 0
ANAL BLEEDING: 0
ABDOMINAL PAIN: 0
SORE THROAT: 0
EYE PAIN: 0

## 2023-11-29 ENCOUNTER — TELEPHONE (OUTPATIENT)
Age: 63
End: 2023-11-29

## 2023-11-29 NOTE — TELEPHONE ENCOUNTER
Per last office note, Aldo Keys is in place of trulicity. Assessment/Plan:   1. Type 2 diabetes mellitus: Poorly controlled.   -Ordering Jardiance 25 mg daily.  - She can try this medication in place of Trulicity if she is still having issues getting this medication through her pharmacy.  -Patient declines metformin.  - Low-carb diet strongly advised

## 2023-11-29 NOTE — TELEPHONE ENCOUNTER
Per last office note, Fiez is in place of trulicity. Patient states that she has both and she was advised at this time she should just take the trulicity. Assessment/Plan:   1. Type 2 diabetes mellitus: Poorly controlled.   -Ordering Jardiance 25 mg daily.  - She can try this medication in place of Trulicity if she is still having issues getting this medication through her pharmacy.  -Patient declines metformin.  - Low-carb diet strongly advised

## 2023-12-01 NOTE — PROGRESS NOTES
Pharmacy Progress Note - Diabetes Management       Assessment / Plan:   Diabetes Management:  Per ADA guidelines, Pt's A1c is not at goal of < 7%. Significant improvement in her FBG values since the start of both Jardiance and Trulicity. No NFBG values to assess. Will have her continue to complete her Trulicity 8.28AZ course before increasing to the 1.5mg weekly dose. Will have her start performing staggered SMBG logs and will reassess in 6 weeks. Nutrition/Lifestyle Modifications:  - Educated pt on the importance of moderating carbohydrate intake. Reviewed sources of carbohydrates and method to help determine appropriate portion sizes (e.g., Diabetes Plate Method). - Advised patient to avoid sugar-sweetened beverages and replace with water or diet/zero sugar option.  - Recommend ~30 minutes consistent, moderately intensive, exercise/day or ~150 minutes/week. Start small, stay consistent, and increase length and types of exercise, as tolerated. Patient will return to clinic in 6 week(s) for follow up. S/O: Ms. Brian Ibarra, a 66 y.o. female referred by Antonio Grimaldo MD,  has a past medical history of Fatty liver and GERD (gastroesophageal reflux disease). Pt was seen today for diabetes management. Patient's last A1c was:   Hemoglobin A1C   Date Value Ref Range Status   09/15/2023 9.2 (H) 4.2 - 5.6 % Final     Comment:     (NOTE)  HbA1C Interpretive Ranges  <5.7              Normal  5.7 - 6.4         Consider Prediabetes  >6.5              Consider Diabetes         Interim update: Pt was last seen by me on 11/3/2023. Per my prior note: Pt's A1c is not at goal of < 7%. From pt's FBG values, her basal control is severely lacking. Insulin initiation would be ideal at this time, but will trial a GLP-1RA to avoid it for now. Will start Trulicity 2.92HP weekly x4 weeks, then increase to 1.5mg weekly.   With the combination of her diet and the titration of Trulicity, will hopefully be

## 2023-12-06 ENCOUNTER — PHARMACY VISIT (OUTPATIENT)
Age: 63
End: 2023-12-06

## 2023-12-06 DIAGNOSIS — E11.9 TYPE 2 DIABETES MELLITUS WITHOUT COMPLICATION, UNSPECIFIED WHETHER LONG TERM INSULIN USE (HCC): Primary | ICD-10-CM

## 2023-12-06 NOTE — PATIENT INSTRUCTIONS
Your Visit Summary:     Plan:  - Continue Trulicity 8.36KJ weekly for another 3 weeks and then increase to the 1.5mg weekly dose    Call me with any questions or concerns  Linda Apolinar [phone]    Check and document your blood sugar first thing in the morning (fasting at least 8 hours), 2 hours after a meal, and/or before bedtime. Bring your meter/log to all future visits. Your blood sugar goals:  - Fasting (first thing in the morning)  blood sugar: 80 - 130mg/dL  - 2 hours after a meal: 80 - 180mg/dL    When you experience symptoms of low blood sugar (example: less than 70):  - Confirm low reading by checking your blood sugar.   - Then treat with 15 grams of carbohydrates (one-half cup of juice or regular soda, or 4-5 glucose tablets). - Wait 15 minutes to recheck blood sugar.   - Then eat a protein containing meal/snack to prevent another low blood sugar episode. (example: peanut butter + crackers)    Nutrition:  - When reviewing a nutrition label, focus on the serving size, total calories, fat (and type of fats), total carbohydrates, sugar (and amount of added sugar), amount of fiber (good for your digestive), and amount of protein. Refer to your nutrition label guide for more information.  - For a meal : max 45 - 60 grams of carbohydrates  - For a snack: max 15 grams of carbohydrates  - Reduce amount of saturated and trans fat. Consider more unsaturated fat options as they are better for your heart health.    - Have at least 1 serving of lean fat protein with each meal.    - Increase fiber intake slowly to prevent constipation.   - Substitute fruit juices for the whole fruit    Low carb snack ideas (15 grams total carb or less):    String cheese or babybel with 6 crackers  4 peanut butter crackers  3 cups of popcorn  1 cup raw vegetables with hummus or ranch dip (just need to watch how much dip you use)  Nuts  2 rice cakes  Celery with peanut butter or cream cheese  String cheese with 1 serving of

## 2024-01-09 ENCOUNTER — OFFICE VISIT (OUTPATIENT)
Age: 64
End: 2024-01-09
Payer: OTHER GOVERNMENT

## 2024-01-09 VITALS
OXYGEN SATURATION: 99 % | HEART RATE: 73 BPM | DIASTOLIC BLOOD PRESSURE: 76 MMHG | BODY MASS INDEX: 30.29 KG/M2 | WEIGHT: 164.6 LBS | TEMPERATURE: 98.2 F | RESPIRATION RATE: 17 BRPM | SYSTOLIC BLOOD PRESSURE: 116 MMHG | HEIGHT: 62 IN

## 2024-01-09 DIAGNOSIS — E11.9 TYPE 2 DIABETES MELLITUS WITHOUT COMPLICATION, UNSPECIFIED WHETHER LONG TERM INSULIN USE (HCC): Primary | ICD-10-CM

## 2024-01-09 PROCEDURE — 99213 OFFICE O/P EST LOW 20 MIN: CPT | Performed by: INTERNAL MEDICINE

## 2024-01-09 ASSESSMENT — PATIENT HEALTH QUESTIONNAIRE - PHQ9
SUM OF ALL RESPONSES TO PHQ QUESTIONS 1-9: 0
2. FEELING DOWN, DEPRESSED OR HOPELESS: 0
SUM OF ALL RESPONSES TO PHQ QUESTIONS 1-9: 0
SUM OF ALL RESPONSES TO PHQ QUESTIONS 1-9: 0
SUM OF ALL RESPONSES TO PHQ9 QUESTIONS 1 & 2: 0
SUM OF ALL RESPONSES TO PHQ QUESTIONS 1-9: 0
1. LITTLE INTEREST OR PLEASURE IN DOING THINGS: 0

## 2024-01-09 ASSESSMENT — ENCOUNTER SYMPTOMS
EYE PAIN: 0
ANAL BLEEDING: 0
BLOOD IN STOOL: 0
ABDOMINAL PAIN: 0
SORE THROAT: 0
COUGH: 0
SHORTNESS OF BREATH: 0

## 2024-01-09 NOTE — PATIENT INSTRUCTIONS
Learning About Tests When You Have Diabetes  Why do you need regular tests?     Diabetes can lead to other health problems if it's not well managed. You'll need tests to monitor how well your diabetes is managed and to check for other things like high cholesterol or kidney problems. Having tests on a regular schedule can help your doctor find problems early, when it's best to start treating them.  What tests do you need?  These are the tests you may need and how often you should have them. The tests may vary depending on whether you have type 1 or type 2 diabetes.  A1c blood test.  This test shows the average level of blood sugar over the past 2 to 3 months. It helps your doctor see whether blood sugar levels have been staying within your target range.  How often: Every 3 to 6 months  Goal: A blood sugar level in your target range  Blood pressure test.  This test measures the pressure of blood flow in the arteries. Controlling blood pressure can help prevent damage to nerves and blood vessels.  How often: Every 3 to 6 months  Goal: A blood pressure level in your target range  Cholesterol test.  This test measures the amount of a type of fat in the blood. It is common for people with diabetes to also have high cholesterol. Too much cholesterol in the blood can build up inside the blood vessels and raise the risk for heart attack and stroke.  How often: At the time of your diabetes diagnosis, and as often as your doctor recommends after that  Goal: A cholesterol level in your target range  Albumin-creatinine ratio test.  This test checks for kidney damage by looking for the protein albumin (say \"al-BYOO-adriana\") in the urine. Albumin is normally found in the blood. Kidney damage can let small amounts of it (microalbumin) leak into the urine.  How often: Once a year  Goal: No protein in the urine  Blood creatinine test/estimated glomerular filtration (eGFR).  The blood creatinine (say \"vcts-GE-gb-neen\") level shows

## 2024-01-09 NOTE — PROGRESS NOTES
Damaris Schmid presents today for   Chief Complaint   Patient presents with    Follow-up     6 wks follow up    Diabetes     6 wks follow up                 1. \"Have you been to the ER, urgent care clinic since your last visit?  Hospitalized since your last visit?\" no    2. \"Have you seen or consulted any other health care providers outside of the [facility] since your last visit?\" no     3. For patients aged 45-75: Has the patient had a colonoscopy / FIT/ Cologuard? Yes - no Care Gap present      If the patient is female:    4. For patients aged 40-74: Has the patient had a mammogram within the past 2 years? Yes - no Care Gap present      5. For patients aged 21-65: Has the patient had a pap smear? Yes - no Care Gap present    
spent in counseling and/or coordination of care.      Voice recognition was used to generate this report, which may have resulted in some phonetic based errors in grammar and contents. Even though attempts were made to correct all the mistakes, some may have been missed, and remained in the body of the document.      No follow-up provider specified.    Roseline Welsh MD

## 2024-01-14 RX ORDER — LINACLOTIDE 72 UG/1
CAPSULE, GELATIN COATED ORAL
Qty: 90 CAPSULE | Refills: 1 | Status: SHIPPED | OUTPATIENT
Start: 2024-01-14

## 2024-01-15 NOTE — PROGRESS NOTES
Pharmacy Progress Note - Diabetes Management       Assessment / Plan:   Diabetes Management:  Per ADA guidelines, Pt's A1c is not at goal of < 7%.  Pt's FBG and NFBG values are all at goal per her SMBG logs.  Will maintain her current Trulicity 1.5mg weekly dose for now and reassess with SMBG logs at follow up in 6 weeks.  May consider a taper to 0.75mg weekly at follow up per pt preference.     Nutrition/Lifestyle Modifications:  - Educated pt on the importance of moderating carbohydrate intake. Reviewed sources of carbohydrates and method to help determine appropriate portion sizes (e.g., Diabetes Plate Method).  - Advised patient to avoid sugar-sweetened beverages and replace with water or diet/zero sugar option.  - Recommend ~30 minutes consistent, moderately intensive, exercise/day or ~150 minutes/week. Start small, stay consistent, and increase length and types of exercise, as tolerated.       Patient will return to clinic in 6 week(s) for follow up.        S/O: Ms. Damaris Schmid, a 63 y.o. female referred by Roseline Welsh MD,  has a past medical history of Fatty liver and GERD (gastroesophageal reflux disease).  Pt was seen today for diabetes management.  Patient's last A1c was:   Hemoglobin A1C   Date Value Ref Range Status   09/15/2023 9.2 (H) 4.2 - 5.6 % Final     Comment:     (NOTE)  HbA1C Interpretive Ranges  <5.7              Normal  5.7 - 6.4         Consider Prediabetes  >6.5              Consider Diabetes         Interim update: Pt was last seen by me on 12/6/2023.  Per my prior note: Pt's A1c is not at goal of < 7%.  Significant improvement in her FBG values since the start of both Jardiance and Trulicity.  No NFBG values to assess.  Will have her continue to complete her Trulicity 0.75mg course before increasing to the 1.5mg weekly dose.  Will have her start performing staggered SMBG logs and will reassess in 6 weeks.  Pt was see by Roseline Welsh MD on 1/9/24 where it was

## 2024-01-17 ENCOUNTER — PHARMACY VISIT (OUTPATIENT)
Age: 64
End: 2024-01-17

## 2024-01-17 DIAGNOSIS — E11.9 TYPE 2 DIABETES MELLITUS WITHOUT COMPLICATION, UNSPECIFIED WHETHER LONG TERM INSULIN USE (HCC): Primary | ICD-10-CM

## 2024-01-17 RX ORDER — DULAGLUTIDE 1.5 MG/.5ML
1.5 INJECTION, SOLUTION SUBCUTANEOUS
Qty: 6 ML | Refills: 3 | Status: SHIPPED | OUTPATIENT
Start: 2024-01-17

## 2024-01-18 ENCOUNTER — TELEPHONE (OUTPATIENT)
Age: 64
End: 2024-01-18

## 2024-01-22 ENCOUNTER — TELEPHONE (OUTPATIENT)
Age: 64
End: 2024-01-22

## 2024-01-22 NOTE — TELEPHONE ENCOUNTER
[employer] will not cover Linzess. Is there an alternative that may work instead?        FELISA Martinez LPN

## 2024-01-22 NOTE — TELEPHONE ENCOUNTER
Hi Dr. Welsh,     I attempted to do a PA for the Linzess but unfortunately once I started for form, it told me to stop because it will not be covered by . Is there an alternative perhaps?

## 2024-01-26 ENCOUNTER — TELEPHONE (OUTPATIENT)
Age: 64
End: 2024-01-26

## 2024-01-26 RX ORDER — TIRZEPATIDE 5 MG/.5ML
5 INJECTION, SOLUTION SUBCUTANEOUS WEEKLY
Qty: 4 ADJUSTABLE DOSE PRE-FILLED PEN SYRINGE | Refills: 3 | Status: SHIPPED | OUTPATIENT
Start: 2024-01-26

## 2024-01-26 NOTE — TELEPHONE ENCOUNTER
Please complete a prior authorization for mounjaro in place of Trulicity.  Unfortunately, due to supply shortages, the patient is unable to Trulicity).      Dr. Roseline Welsh  [facility] [address], [address]  [place] [address]  Phone: [phone]  Fax: [phone]

## 2024-01-26 NOTE — TELEPHONE ENCOUNTER
PT understood instructions. She was told by GI not to take Miralax but will try the other alternatives.

## 2024-01-26 NOTE — TELEPHONE ENCOUNTER
PATIENT CALLED IN STATING SHE IS OUT OF TRULICITY (dulaglutide (TRULICITY) 1.5 MG/0.5ML SC injection) WITH ONLY 1 NEEDLE LEFT, HER PHARMACY INFORMED HER THAT THEY ARE OUT OF STOCK AND WONT HAVE ANY UNTIL THE END OF FEBRUARY. SHE WOULD LIKE TO KNOW WHAT IS IT THAT CAN BE DONE TO RECEIVE HER MEDICATION.  CB#: [phone]

## 2024-01-26 NOTE — TELEPHONE ENCOUNTER
While we are waiting to get prior authorization for mounjaro, she can take jardiance, which was previously prescribed for her.    Dr. Roseline Welsh  [facility] [address], [address]  [place] [address]  Phone: [phone]  Fax: [phone]

## 2024-01-30 ENCOUNTER — TELEPHONE (OUTPATIENT)
Age: 64
End: 2024-01-30

## 2024-01-30 NOTE — TELEPHONE ENCOUNTER
As mentioned earlier, please have the patient take Jardiance (previously prescribed) in place of Mounjaro/Trulicity due to insurance denying Mounjaro and local storages of Trulicity.    Dr. Roseline Welsh  [facility] [address], [address]  [place] [address]  Phone: [phone]  Fax: [phone]

## 2024-01-30 NOTE — TELEPHONE ENCOUNTER
Mounjaro has been denied by insurance due to the pt has not tried metformin or the pt doesn't have a contraindication to it.        FELISA Martinez, SARAHN

## 2024-02-26 NOTE — PROGRESS NOTES
understanding of the information presented and all of the patient’s questions were answered.  AVS was handed to the patient. Patient advised to call the office with any additional questions or concerns.    Notifications of recommendations will be sent to Roseline Welsh MD for review.      Thank you for the consult,  Den Jose, PharmD, BCACP, [facility]        For Pharmacy Admin Tracking Only    Program: Medical Group  CPA in place:  Yes  Recommendation Provided To: Patient/Caregiver: 3 via In person  Intervention Detail: Adherence Monitorin, Dose Adjustment: 1, reason: Therapy De-escalation, and New Rx: 1, reason: Needs Additional Therapy  Intervention Accepted By: Patient/Caregiver: 3  Gap Closed?: Yes   Time Spent (min): 20

## 2024-02-28 ENCOUNTER — PHARMACY VISIT (OUTPATIENT)
Age: 64
End: 2024-02-28

## 2024-02-28 DIAGNOSIS — E11.9 TYPE 2 DIABETES MELLITUS WITHOUT COMPLICATION, UNSPECIFIED WHETHER LONG TERM INSULIN USE (HCC): Primary | ICD-10-CM

## 2024-02-28 RX ORDER — DULAGLUTIDE 0.75 MG/.5ML
0.75 INJECTION, SOLUTION SUBCUTANEOUS WEEKLY
Qty: 6 ML | Refills: 3 | Status: SHIPPED | OUTPATIENT
Start: 2024-02-28

## 2024-04-10 ENCOUNTER — HOSPITAL ENCOUNTER (OUTPATIENT)
Facility: HOSPITAL | Age: 64
Setting detail: SPECIMEN
Discharge: HOME | End: 2024-04-13
Payer: OTHER GOVERNMENT

## 2024-04-10 DIAGNOSIS — E11.9 TYPE 2 DIABETES MELLITUS WITHOUT COMPLICATION, UNSPECIFIED WHETHER LONG TERM INSULIN USE (HCC): ICD-10-CM

## 2024-04-10 LAB
EST. AVERAGE GLUCOSE BLD GHB EST-MCNC: 108 MG/DL
HBA1C MFR BLD: 5.4 % (ref 4.2–5.6)

## 2024-04-10 PROCEDURE — 36415 COLL VENOUS BLD VENIPUNCTURE: CPT

## 2024-04-10 PROCEDURE — 83036 HEMOGLOBIN GLYCOSYLATED A1C: CPT

## 2024-04-16 ENCOUNTER — OFFICE VISIT (OUTPATIENT)
Age: 64
End: 2024-04-16
Payer: OTHER GOVERNMENT

## 2024-04-16 VITALS
WEIGHT: 168 LBS | BODY MASS INDEX: 30.91 KG/M2 | DIASTOLIC BLOOD PRESSURE: 83 MMHG | HEIGHT: 62 IN | TEMPERATURE: 97.8 F | SYSTOLIC BLOOD PRESSURE: 119 MMHG | OXYGEN SATURATION: 100 % | HEART RATE: 70 BPM

## 2024-04-16 DIAGNOSIS — E11.9 TYPE 2 DIABETES MELLITUS WITHOUT COMPLICATION, UNSPECIFIED WHETHER LONG TERM INSULIN USE (HCC): Primary | ICD-10-CM

## 2024-04-16 PROCEDURE — 99214 OFFICE O/P EST MOD 30 MIN: CPT | Performed by: INTERNAL MEDICINE

## 2024-04-16 PROCEDURE — 3044F HG A1C LEVEL LT 7.0%: CPT | Performed by: INTERNAL MEDICINE

## 2024-04-16 ASSESSMENT — ENCOUNTER SYMPTOMS
ANAL BLEEDING: 0
EYE PAIN: 0
ABDOMINAL PAIN: 0
SHORTNESS OF BREATH: 0
SORE THROAT: 0
BLOOD IN STOOL: 0
COUGH: 0

## 2024-04-16 NOTE — PROGRESS NOTES
Damaris Schmid presents today for   Chief Complaint   Patient presents with    Follow-up                 1. \"Have you been to the ER, urgent care clinic since your last visit?  Hospitalized since your last visit?\" no    2. \"Have you seen or consulted any other health care providers outside of the [facility] since your last visit?\" no     3. For patients aged 45-75: Has the patient had a colonoscopy / FIT/ Cologuard? No      If the patient is female:    4. For patients aged 40-74: Has the patient had a mammogram within the past 2 years? Yes - no Care Gap present      5. For patients aged 21-65: Has the patient had a pap smear? Yes - no Care Gap present

## 2024-04-16 NOTE — PATIENT INSTRUCTIONS
Learning About Tests When You Have Diabetes  Why do you need regular tests?     Diabetes can lead to other health problems if it's not well managed. You'll need tests to monitor how well your diabetes is managed and to check for other things like high cholesterol or kidney problems. Having tests on a regular schedule can help your doctor find problems early, when it's best to start treating them.  What tests do you need?  These are the tests you may need and how often you should have them. The tests may vary depending on whether you have type 1 or type 2 diabetes.  A1c blood test.  This test shows the average level of blood sugar over the past 2 to 3 months. It helps your doctor see whether blood sugar levels have been staying within your target range.  How often: Every 3 to 6 months  Goal: A blood sugar level in your target range  Blood pressure test.  This test measures the pressure of blood flow in the arteries. Controlling blood pressure can help prevent damage to nerves and blood vessels.  How often: Every 3 to 6 months  Goal: A blood pressure level in your target range  Cholesterol test.  This test measures the amount of a type of fat in the blood. It is common for people with diabetes to also have high cholesterol. Too much cholesterol in the blood can build up inside the blood vessels and raise the risk for heart attack and stroke.  How often: At the time of your diabetes diagnosis, and as often as your doctor recommends after that  Goal: A cholesterol level in your target range  Albumin-creatinine ratio test.  This test checks for kidney damage by looking for the protein albumin (say \"al-BYOO-adriana\") in the urine. Albumin is normally found in the blood. Kidney damage can let small amounts of it (microalbumin) leak into the urine.  How often: Once a year  Goal: No protein in the urine  Blood creatinine test/estimated glomerular filtration (eGFR).  The blood creatinine (say \"sqsi-VW-pz-neen\") level shows

## 2024-04-16 NOTE — PROGRESS NOTES
[facility]:  4/16/2024, MRN: [id number]      Damaris Schmid is a 64 y.o. female and presents to clinic for Follow-up      Subjective:   The patient is a 64-year-old female with history of eczema, vitiligo, genital herpes, type 2 diabetes, HLD, abnormal Pap smear, hemorrhoids, and BESS.     Type 2 DM: Treated with trulicity 0.75mg weekly. Her A1C was 9.2 in September. Since then, she was started on trulicity and has been doing intermittent fasting. Her BP is 119/83. Recent labs show that her A1C is now 5.4!!! She is exercising. BMI is  30. Weight is 168lbs.      Patient Active Problem List    Diagnosis Date Noted    Allergic rhinitis 09/22/2022    Borderline glaucoma, open angle with borderline findings 09/22/2022    Chronic constipation 09/22/2022    Obesity 09/22/2022    Diabetes mellitus type 2, diet-controlled (HCC) 11/09/2021    Gastritis 07/10/2020    History of abnormal cervical Pap smear 07/15/2019    Mixed hyperlipidemia 07/15/2019    Genital herpes 05/31/2018    BESS (nonalcoholic steatohepatitis) 05/31/2018    Eczema 05/31/2018    Vitiligo 05/31/2018       Current Outpatient Medications   Medication Sig Dispense Refill    empagliflozin (JARDIANCE) 25 MG tablet Take 1 tablet by mouth daily 30 tablet 5    LINZESS 72 MCG CAPS capsule TAKE 1 CAPSULE DAILY BEFORE BREAKFAST 90 capsule 1    Blood Glucose Monitoring Suppl (FREESTYLE FREEDOM LITE) w/Device KIT 1 kit by Does not apply route daily 1 kit 0    FreeStyle Lancets MISC 1 each by Does not apply route daily 100 each 3    blood glucose test strips (FREESTYLE LITE) strip 1 each by In Vitro route daily As needed. 100 each 3    pravastatin (PRAVACHOL) 40 MG tablet Take 1 tablet by mouth at bedtime      ibuprofen (ADVIL;MOTRIN) 600 MG tablet Take by mouth every 6 hours as needed      omeprazole (PRILOSEC) 20 MG delayed release capsule Take 1 capsule by mouth daily       No current facility-administered medications for this visit.       Allergies

## 2024-04-22 NOTE — PROGRESS NOTES
Pharmacy Progress Note - Diabetes Management       Assessment / Plan:   Diabetes Management:  Per ADA guidelines, Pt's A1c is at goal of < 7%.  Significant improvement in her A1c down to the normal range.  Her glycemic control has been maintained with her lower Trulicity 0.75mg weekly dose.  She will be switching to Jardiance 25mg qam in 4-5 weeks.  Will reassess with SMBG logs at follow up in 8 weeks to determine if this switch is feasible long-term when combined with her dietary and lifestyle changes.       Nutrition/Lifestyle Modifications:  - Educated pt on the importance of moderating carbohydrate intake. Reviewed sources of carbohydrates and method to help determine appropriate portion sizes (e.g., Diabetes Plate Method).  - Advised patient to avoid sugar-sweetened beverages and replace with water or diet/zero sugar option.  - Recommend ~30 minutes consistent, moderately intensive, exercise/day or ~150 minutes/week. Start small, stay consistent, and increase length and types of exercise, as tolerated.       Patient will return to clinic in 8 week(s) for follow up.        S/O: Ms. Damaris Schmid, a 64 y.o. female referred by Roseline Welsh MD,  has a past medical history of Fatty liver and GERD (gastroesophageal reflux disease).  Pt was seen today for diabetes management.  Patient's last A1c was:   Hemoglobin A1C   Date Value Ref Range Status   04/10/2024 5.4 4.2 - 5.6 % Final     Comment:     (NOTE)  HbA1C Interpretive Ranges  <5.7              Normal  5.7 - 6.4         Consider Prediabetes  >6.5              Consider Diabetes         Interim update: Pt was last seen by me on 2/28/2024.  Per my prior note: Pt's A1c is not at goal of < 7%.  Pt's glycemic control is maintained with all SMBG logs at goal.  Given the consistent control between appointments and the lack of availability of the Trulicity 1.5mg dose, will decrease her dose to 0.75mg weekly to continue GLP-RA tx.  Encouraged to maintain her

## 2024-04-24 ENCOUNTER — PHARMACY VISIT (OUTPATIENT)
Age: 64
End: 2024-04-24

## 2024-04-24 DIAGNOSIS — E11.9 TYPE 2 DIABETES MELLITUS WITHOUT COMPLICATION, UNSPECIFIED WHETHER LONG TERM INSULIN USE (HCC): Primary | ICD-10-CM

## 2024-04-24 RX ORDER — DULAGLUTIDE 0.75 MG/.5ML
0.75 INJECTION, SOLUTION SUBCUTANEOUS WEEKLY
COMMUNITY

## 2024-05-17 RX ORDER — PRAVASTATIN SODIUM 40 MG
40 TABLET ORAL NIGHTLY
Qty: 30 TABLET | Refills: 11 | Status: SHIPPED | OUTPATIENT
Start: 2024-05-17

## 2024-06-12 NOTE — PROGRESS NOTES
(Menomune) 02/26/1987    PPD Test 12/15/2006, 05/16/2008, 06/04/2010, 06/29/2010, 06/06/2011    Poliovirus, IPOL, (age 6w+), SC/IM, 0.5mL 02/26/1987, 03/09/1987, 10/07/1989, 10/07/1998    TDaP, ADACEL (age 10y-64y), BOOSTRIX (age 10y+), IM, 0.5mL 05/23/2008, 07/14/2021    Td vaccine (adult) 01/30/1998    Tetanus 01/30/1998    Typhoid Vac, Parenteral, Other Than Acetone-killed, Dried 06/19/2000    Typhoid Vaccine 06/19/2000, 12/10/2002    Typhoid Vi capsular polysaccharide (Typhim VI) 12/10/2002    Yellow Fever (YF-Vax) 01/30/1998    Zoster Recombinant (Shingrix) 08/14/2018, 10/15/2018       Additional Laboratory Parameters of Interest:   Estimation of renal function:  Lab Results   Component Value Date/Time    LABGLOM >60 11/14/2023 09:03 AM    LABGLOM >60 05/05/2023 10:48 AM    GFRAA >60 07/08/2022 08:40 AM    GFRAA >60 07/14/2021 09:42 AM    GFRAA 78 07/28/2020 08:12 AM     Wt Readings from Last 3 Encounters:   04/16/24 76.2 kg (168 lb)   01/09/24 74.7 kg (164 lb 9.6 oz)   11/21/23 75.3 kg (166 lb)     Ht Readings from Last 1 Encounters:   04/16/24 1.575 m (5' 2\")     Calculated estimated creatinine clearance: CrCl cannot be calculated (Patient's most recent lab result is older than the maximum 180 days allowed.).    Vital Signs Today:    There were no vitals taken for this visit.    Medications Discontinued During This Encounter   Medication Reason    dulaglutide (TRULICITY) 0.75 MG/0.5ML SOPN SC injection Therapy completed    empagliflozin (JARDIANCE) 25 MG tablet REORDER       Orders Placed This Encounter    empagliflozin (JARDIANCE) 25 MG tablet     Sig: Take 1 tablet by mouth every morning     Dispense:  90 tablet     Refill:  3       Future Appointments   Date Time Provider Department Center   7/29/2024  9:00 AM Den Jose, [facility] AMB   8/22/2024  9:30 AM [facility] VISIT [facility] AM[facility]   8/29/2024 10:00 AM Roseline Welsh MD [facility] AMB       Patient verbalized understanding of the information

## 2024-06-17 ENCOUNTER — PHARMACY VISIT (OUTPATIENT)
Facility: CLINIC | Age: 64
End: 2024-06-17

## 2024-06-17 DIAGNOSIS — E11.9 TYPE 2 DIABETES MELLITUS WITHOUT COMPLICATION, UNSPECIFIED WHETHER LONG TERM INSULIN USE (HCC): ICD-10-CM

## 2024-07-15 ENCOUNTER — TELEPHONE (OUTPATIENT)
Facility: CLINIC | Age: 64
End: 2024-07-15

## 2024-07-15 NOTE — TELEPHONE ENCOUNTER
Pt states she is having vaginal \"perineal\" itching she said it is a reaction from JARDIANCE  pt is asking if Dr wants her to continue taking it please advise

## 2024-07-15 NOTE — TELEPHONE ENCOUNTER
Called pt via phone and advised to hold Jardiance per provider and appt scheduled for open availability @ 11AM. Please confirm that this time works.      FELISA Martinez LPN

## 2024-07-16 ENCOUNTER — OFFICE VISIT (OUTPATIENT)
Facility: CLINIC | Age: 64
End: 2024-07-16
Payer: OTHER GOVERNMENT

## 2024-07-16 ENCOUNTER — HOSPITAL ENCOUNTER (OUTPATIENT)
Facility: HOSPITAL | Age: 64
Setting detail: SPECIMEN
Discharge: HOME | End: 2024-07-19
Payer: OTHER GOVERNMENT

## 2024-07-16 VITALS
BODY MASS INDEX: 30.73 KG/M2 | RESPIRATION RATE: 20 BRPM | DIASTOLIC BLOOD PRESSURE: 74 MMHG | OXYGEN SATURATION: 96 % | TEMPERATURE: 98.2 F | HEART RATE: 68 BPM | SYSTOLIC BLOOD PRESSURE: 113 MMHG | WEIGHT: 167 LBS | HEIGHT: 62 IN

## 2024-07-16 DIAGNOSIS — R35.0 URINARY FREQUENCY: ICD-10-CM

## 2024-07-16 DIAGNOSIS — E11.9 CONTROLLED TYPE 2 DIABETES MELLITUS WITHOUT COMPLICATION, WITHOUT LONG-TERM CURRENT USE OF INSULIN (HCC): ICD-10-CM

## 2024-07-16 DIAGNOSIS — N76.0 ACUTE VAGINITIS: Primary | ICD-10-CM

## 2024-07-16 LAB
BILIRUBIN, URINE, POC: NEGATIVE
BLOOD URINE, POC: NEGATIVE
GLUCOSE URINE, POC: NORMAL
KETONES, URINE, POC: NORMAL
LEUKOCYTE ESTERASE, URINE, POC: NEGATIVE
NITRITE, URINE, POC: NEGATIVE
PH, URINE, POC: 5 (ref 4.6–8)
PROTEIN,URINE, POC: NEGATIVE
SPECIFIC GRAVITY, URINE, POC: 1.02 (ref 1–1.03)
URINALYSIS CLARITY, POC: CLEAR
URINALYSIS COLOR, POC: YELLOW
UROBILINOGEN, POC: NORMAL

## 2024-07-16 PROCEDURE — 87086 URINE CULTURE/COLONY COUNT: CPT

## 2024-07-16 PROCEDURE — 99213 OFFICE O/P EST LOW 20 MIN: CPT | Performed by: INTERNAL MEDICINE

## 2024-07-16 PROCEDURE — 81001 URINALYSIS AUTO W/SCOPE: CPT | Performed by: INTERNAL MEDICINE

## 2024-07-16 RX ORDER — FLUCONAZOLE 150 MG/1
150 TABLET ORAL ONCE
Qty: 1 TABLET | Refills: 0 | Status: SHIPPED | OUTPATIENT
Start: 2024-07-16 | End: 2024-07-16

## 2024-07-16 NOTE — PROGRESS NOTES
Damaris Schmid presents today for   Chief Complaint   Patient presents with    Medication Problem     Patient states she is having itching and soreness in the  perineal area.     Patient has stopped Jardiance.      \"Have you been to the ER, urgent care clinic since your last visit?  Hospitalized since your last visit?\"    NO    “Have you seen or consulted any other health care providers outside of [facility] since your last visit?”    NO        “Have you had a pap smear?”    NO    No cervical cancer screening on file

## 2024-07-16 NOTE — PROGRESS NOTES
[facility]:  7/22/2024, MRN: [id number]      Damaris Schmid is a 64 y.o. female and presents to clinic for Medication Problem (Patient states she is having itching and soreness in the  perineal area.)      Subjective:   The patient is a 64-year-old female with history of eczema, vitiligo, genital herpes, type 2 diabetes, HLD, abnormal Pap smear, hemorrhoids, and BESS.     Peroneal Itching, Frequency, and Type 2 DM:  Her last sexual encounter was 3 yrs ago. She has vaginal itching. She has some relief with use of vagisil otc. She began this otc rx on Friday. No urinary sx. She has a small amt of d/c. The vaginal odor she initially had was foul and is improving with the vagisil otc. She is on jardiance 25mg daily until I had her stop it this wk due to sx. +Frequency.  Sx have been present for about a wk.  She was on trulicity but transitioned to jardiance due to supply shortages locally.  Her last A1C was 5.4.      Patient Active Problem List    Diagnosis Date Noted    Allergic rhinitis 09/22/2022    Borderline glaucoma, open angle with borderline findings 09/22/2022    Chronic constipation 09/22/2022    Obesity 09/22/2022    Controlled type 2 diabetes mellitus without complication, without long-term current use of insulin ([facility]) 11/09/2021    Gastritis 07/10/2020    History of abnormal cervical Pap smear 07/15/2019    Mixed hyperlipidemia 07/15/2019    Genital herpes 05/31/2018    BESS (nonalcoholic steatohepatitis) 05/31/2018    Eczema 05/31/2018    Vitiligo 05/31/2018       Current Outpatient Medications   Medication Sig Dispense Refill    pravastatin (PRAVACHOL) 40 MG tablet TAKE 1 TABLET NIGHTLY 30 tablet 11    LINZESS 72 MCG CAPS capsule TAKE 1 CAPSULE DAILY BEFORE BREAKFAST 90 capsule 1    Blood Glucose Monitoring Suppl (FREESTYLE FREEDOM LITE) w/Device KIT 1 kit by Does not apply route daily 1 kit 0    FreeStyle Lancets MISC 1 each by Does not apply route daily 100 each 3    blood glucose test

## 2024-07-17 LAB
BACTERIA SPEC CULT: NORMAL
SERVICE CMNT-IMP: NORMAL

## 2024-07-22 PROBLEM — E11.9 CONTROLLED TYPE 2 DIABETES MELLITUS WITHOUT COMPLICATION, WITHOUT LONG-TERM CURRENT USE OF INSULIN (HCC): Status: ACTIVE | Noted: 2021-11-09

## 2024-07-22 ASSESSMENT — ENCOUNTER SYMPTOMS
SORE THROAT: 0
ANAL BLEEDING: 0
COUGH: 0
EYE PAIN: 0
ABDOMINAL PAIN: 0
BLOOD IN STOOL: 0
SHORTNESS OF BREATH: 0

## 2024-07-25 NOTE — PROGRESS NOTES
Pharmacy Progress Note - Diabetes Management       Assessment / Plan:   Diabetes Management:  Per ADA guidelines, Pt's A1c is at goal of < 7%.  Pt's glycemic control has been maintained while off of the Jardiance at this point.  However, would prefer for pt to restart this medication after her sx resolve to maintain her glycemic control as well as provide cariorenal benefits.  Will reassess with SMBG logs at follow up in 2 months.     Nutrition/Lifestyle Modifications:  - Educated pt on the importance of moderating carbohydrate intake. Reviewed sources of carbohydrates and method to help determine appropriate portion sizes (e.g., Diabetes Plate Method).  - Advised patient to avoid sugar-sweetened beverages and replace with water or diet/zero sugar option.  - Recommend ~30 minutes consistent, moderately intensive, exercise/day or ~150 minutes/week. Start small, stay consistent, and increase length and types of exercise, as tolerated.       Patient will return to clinic in 2 month(s) for follow up.        S/O: Ms. Damaris Schmid, a 64 y.o. female referred by Roseline Welsh MD,  has a past medical history of Fatty liver and GERD (gastroesophageal reflux disease).  Pt was seen today for diabetes management.  Patient's last A1c was:   Hemoglobin A1C   Date Value Ref Range Status   04/10/2024 5.4 4.2 - 5.6 % Final     Comment:     (NOTE)  HbA1C Interpretive Ranges  <5.7              Normal  5.7 - 6.4         Consider Prediabetes  >6.5              Consider Diabetes         Interim update: Pt was last seen by me on 6/17/2024.  Per my prior note: Pt's A1c is at goal of < 7%.  Pt's glycemic control has been maintained while off of Trulicity and on Jardiance monotherapy.  Encouraged to maintain her diabetic diet to maintain glycemic control.  Will maintain her current tx and will reassess with SMBG logs in 6 weeks.    Pt had Jardiance held d/t urinary frequency and acute vaginitis by Roseline Welsh MD on

## 2024-07-29 ENCOUNTER — PHARMACY VISIT (OUTPATIENT)
Facility: CLINIC | Age: 64
End: 2024-07-29

## 2024-07-29 DIAGNOSIS — E11.9 CONTROLLED TYPE 2 DIABETES MELLITUS WITHOUT COMPLICATION, WITHOUT LONG-TERM CURRENT USE OF INSULIN (HCC): Primary | ICD-10-CM

## 2024-08-22 ENCOUNTER — HOSPITAL ENCOUNTER (OUTPATIENT)
Facility: HOSPITAL | Age: 64
Setting detail: SPECIMEN
Discharge: HOME | End: 2024-08-22
Payer: OTHER GOVERNMENT

## 2024-08-22 DIAGNOSIS — E11.9 TYPE 2 DIABETES MELLITUS WITHOUT COMPLICATION, UNSPECIFIED WHETHER LONG TERM INSULIN USE (HCC): ICD-10-CM

## 2024-08-22 LAB
ALBUMIN SERPL-MCNC: 4.2 G/DL (ref 3.4–5)
ALBUMIN/GLOB SERPL: 1.1 (ref 0.8–1.7)
ALP SERPL-CCNC: 78 U/L (ref 45–117)
ALT SERPL-CCNC: 24 U/L (ref 13–56)
ANION GAP SERPL CALC-SCNC: 7 MMOL/L (ref 3–18)
AST SERPL-CCNC: 16 U/L (ref 10–38)
BILIRUB SERPL-MCNC: 1 MG/DL (ref 0.2–1)
BUN SERPL-MCNC: 14 MG/DL (ref 7–18)
BUN/CREAT SERPL: 13 (ref 12–20)
CALCIUM SERPL-MCNC: 9.9 MG/DL (ref 8.5–10.1)
CHLORIDE SERPL-SCNC: 103 MMOL/L (ref 100–111)
CHOLEST SERPL-MCNC: 178 MG/DL
CO2 SERPL-SCNC: 26 MMOL/L (ref 21–32)
CREAT SERPL-MCNC: 1.08 MG/DL (ref 0.6–1.3)
CREAT UR-MCNC: 133 MG/DL (ref 30–125)
EST. AVERAGE GLUCOSE BLD GHB EST-MCNC: 128 MG/DL
GLOBULIN SER CALC-MCNC: 3.9 G/DL (ref 2–4)
GLUCOSE SERPL-MCNC: 126 MG/DL (ref 74–99)
HBA1C MFR BLD: 6.1 % (ref 4.2–5.6)
HDLC SERPL-MCNC: 57 MG/DL (ref 40–60)
HDLC SERPL: 3.1 (ref 0–5)
LDLC SERPL CALC-MCNC: 104.4 MG/DL (ref 0–100)
LIPID PANEL: ABNORMAL
MICROALBUMIN UR-MCNC: 0.53 MG/DL (ref 0–3)
MICROALBUMIN/CREAT UR-RTO: 4 MG/G (ref 0–30)
POTASSIUM SERPL-SCNC: 4.7 MMOL/L (ref 3.5–5.5)
PROT SERPL-MCNC: 8.1 G/DL (ref 6.4–8.2)
SODIUM SERPL-SCNC: 136 MMOL/L (ref 136–145)
TRIGL SERPL-MCNC: 83 MG/DL
VLDLC SERPL CALC-MCNC: 16.6 MG/DL

## 2024-08-22 PROCEDURE — 82570 ASSAY OF URINE CREATININE: CPT

## 2024-08-22 PROCEDURE — 36415 COLL VENOUS BLD VENIPUNCTURE: CPT

## 2024-08-22 PROCEDURE — 82043 UR ALBUMIN QUANTITATIVE: CPT

## 2024-08-22 PROCEDURE — 80061 LIPID PANEL: CPT

## 2024-08-22 PROCEDURE — 83036 HEMOGLOBIN GLYCOSYLATED A1C: CPT

## 2024-08-22 PROCEDURE — 80053 COMPREHEN METABOLIC PANEL: CPT

## 2024-08-29 ENCOUNTER — OFFICE VISIT (OUTPATIENT)
Facility: CLINIC | Age: 64
End: 2024-08-29
Payer: OTHER GOVERNMENT

## 2024-08-29 VITALS
SYSTOLIC BLOOD PRESSURE: 107 MMHG | RESPIRATION RATE: 17 BRPM | BODY MASS INDEX: 30.33 KG/M2 | HEART RATE: 81 BPM | DIASTOLIC BLOOD PRESSURE: 68 MMHG | HEIGHT: 62 IN | TEMPERATURE: 98.3 F | OXYGEN SATURATION: 95 % | WEIGHT: 164.8 LBS

## 2024-08-29 DIAGNOSIS — E78.2 MIXED HYPERLIPIDEMIA: ICD-10-CM

## 2024-08-29 DIAGNOSIS — E11.9 CONTROLLED TYPE 2 DIABETES MELLITUS WITHOUT COMPLICATION, WITHOUT LONG-TERM CURRENT USE OF INSULIN (HCC): Primary | ICD-10-CM

## 2024-08-29 DIAGNOSIS — N76.0 ACUTE VAGINITIS: ICD-10-CM

## 2024-08-29 PROCEDURE — 3044F HG A1C LEVEL LT 7.0%: CPT | Performed by: INTERNAL MEDICINE

## 2024-08-29 PROCEDURE — 99214 OFFICE O/P EST MOD 30 MIN: CPT | Performed by: INTERNAL MEDICINE

## 2024-08-29 RX ORDER — LANCETS 28 GAUGE
1 EACH MISCELLANEOUS DAILY
Qty: 100 EACH | Refills: 3 | Status: SHIPPED | OUTPATIENT
Start: 2024-08-29

## 2024-08-29 RX ORDER — BLOOD-GLUCOSE METER
1 KIT MISCELLANEOUS DAILY
Qty: 100 EACH | Refills: 3 | Status: SHIPPED | OUTPATIENT
Start: 2024-08-29

## 2024-08-29 SDOH — ECONOMIC STABILITY: FOOD INSECURITY: WITHIN THE PAST 12 MONTHS, YOU WORRIED THAT YOUR FOOD WOULD RUN OUT BEFORE YOU GOT MONEY TO BUY MORE.: NEVER TRUE

## 2024-08-29 SDOH — ECONOMIC STABILITY: FOOD INSECURITY: WITHIN THE PAST 12 MONTHS, THE FOOD YOU BOUGHT JUST DIDN'T LAST AND YOU DIDN'T HAVE MONEY TO GET MORE.: NEVER TRUE

## 2024-08-29 SDOH — ECONOMIC STABILITY: INCOME INSECURITY: HOW HARD IS IT FOR YOU TO PAY FOR THE VERY BASICS LIKE FOOD, HOUSING, MEDICAL CARE, AND HEATING?: NOT HARD AT ALL

## 2024-08-29 NOTE — PROGRESS NOTES
Damaris Schmid presents today for   Chief Complaint   Patient presents with    Follow-up     4 mon f/u    Diabetes     4  mon f/u           \"Have you been to the ER, urgent care clinic since your last visit?  Hospitalized since your last visit?\"    NO    “Have you seen or consulted any other health care providers outside of [facility] since your last visit?”    NO        “Have you had a pap smear?”    NO    No cervical cancer screening on file           
 08/22/2024 09:24 AM    CO2 26 08/22/2024 09:24 AM    BUN 14 08/22/2024 09:24 AM    GFRAA >60 07/08/2022 08:40 AM       Lab Results   Component Value Date/Time    CHOL 178 08/22/2024 09:24 AM    HDL 57 08/22/2024 09:24 AM    .4 08/22/2024 09:24 AM    .2 05/05/2023 10:48 AM    VLDL 16.6 08/22/2024 09:24 AM       No results found for: \"HBA1C\", \"[id number]\"    Assessment/Plan:   1. Controlled type 2 diabetes mellitus without complication: Well controlled but worsened.  -Refilling her glucometer and supplies.  - Continue with Jardiance 25 mg daily.  - Continue with a low-carb diet.  -Checking a follow-up A1c in 4 months.    2. Mixed hyperlipidemia: Her LDL is mildly elevated.  -Continue with pravastatin 40 mg daily.  -Heart healthy diet friendly diet encouraged.    3. Acute vaginitis: Resolved. Observation.          ICD-10-CM    1. Controlled type 2 diabetes mellitus without complication, without long-term current use of insulin ([facility])  E11.9 FreeStyle Lancets MISC     blood glucose test strips (FREESTYLE LITE) strip     Hemoglobin A1C      2. Mixed hyperlipidemia  E78.2       3. Acute vaginitis  N76.0             Health Maintenance Due   Topic Date Due    Pneumococcal 0-64 years Vaccine (1 of 2 - PCV) Never done    Diabetic foot exam  Never done    HIV screen  Never done    Hepatitis B vaccine (2 of 3 - 19+ 3-dose series) 06/07/1989    Cervical cancer screen  Never done    Respiratory Syncytial Virus (RSV) Pregnant or age 60 yrs+ (1 - 1-dose 60+ series) Never done    Flu vaccine (1) 08/01/2024    COVID-19 Vaccine (3 - 2023-24 season) 09/01/2024         Lab review: labs are reviewed in the EHR and ordered as mentioned above    I have discussed the diagnosis with the patient and the intended plan as seen in the above orders.  The patient has received an after-visit summary and questions were answered concerning future plans.  I have discussed medication side effects and warnings with the patient as well.

## 2024-08-29 NOTE — PATIENT INSTRUCTIONS
Learning About Tests When You Have Diabetes  Why do you need regular tests?     Diabetes can lead to other health problems if it's not well managed. You'll need tests to monitor how well your diabetes is managed and to check for other things like high cholesterol or kidney problems. Having tests on a regular schedule can help your doctor find problems early, when it's best to start treating them.  What tests do you need?  These are the tests you may need and how often you should have them. The tests may vary depending on whether you have type 1 or type 2 diabetes.  A1c blood test.  This test shows the average level of blood sugar over the past 2 to 3 months. It helps your doctor see whether blood sugar levels have been staying within your target range.  How often: Every 3 to 6 months  Goal: A blood sugar level in your target range  Blood pressure test.  This test measures the pressure of blood flow in the arteries. Controlling blood pressure can help prevent damage to nerves and blood vessels.  How often: Every 3 to 6 months  Goal: A blood pressure level in your target range  Cholesterol test.  This test measures the amount of a type of fat in the blood. It is common for people with diabetes to also have high cholesterol. Too much cholesterol in the blood can build up inside the blood vessels and raise the risk for heart attack and stroke.  How often: At the time of your diabetes diagnosis, and as often as your doctor recommends after that  Goal: A cholesterol level in your target range  Albumin-creatinine ratio test.  This test checks for kidney damage by looking for the protein albumin (say \"al-BYOO-adriana\") in the urine. Albumin is normally found in the blood. Kidney damage can let small amounts of it (microalbumin) leak into the urine.  How often: Once a year  Goal: No protein in the urine  Blood creatinine test/estimated glomerular filtration (eGFR).  The blood creatinine (say \"oaae-KV-za-neen\") level shows

## 2024-09-04 ASSESSMENT — ENCOUNTER SYMPTOMS
ANAL BLEEDING: 0
SHORTNESS OF BREATH: 0
ABDOMINAL PAIN: 0
COUGH: 0
EYE PAIN: 0
BLOOD IN STOOL: 0
SORE THROAT: 0

## 2024-09-25 NOTE — PROGRESS NOTES
Pharmacy Progress Note - Diabetes Management       Assessment / Plan:   Diabetes Management:  Per ADA guidelines, Pt's A1c is at goal of < 7%.  Pt's A1c and glycemic control are maintained on her current tx.  She is tolerating the Jardiance well at this time.  Will maintain her current tx and will reassess with SMBG logs at follow up in 6 months.    Hyperlipidemia:  The 10-year ASCVD risk score (Starr KIM, et al., 2019) is: 8% based on parameters listed. Current lipid treatment guidelines recommend at least moderate-intensity statin doses for all patients with diabetes to decrease overall ASCVD risk. Patient currently qualifies for a moderate intensity statin therapy based on current recommendations and is currently taking a moderate intensity statin.      Nutrition/Lifestyle Modifications:  - Educated pt on the importance of moderating carbohydrate intake. Reviewed sources of carbohydrates and method to help determine appropriate portion sizes (e.g., Diabetes Plate Method).  - Advised patient to avoid sugar-sweetened beverages and replace with water or diet/zero sugar option.  - Recommend ~30 minutes consistent, moderately intensive, exercise/day or ~150 minutes/week. Start small, stay consistent, and increase length and types of exercise, as tolerated.       Patient will return to clinic in 6 month(s) for follow up.        S/O: Ms. Damaris Schmid, a 64 y.o. female referred by Roseline Welsh MD,  has a past medical history of Fatty liver and GERD (gastroesophageal reflux disease).  Pt was seen today for diabetes management.  Patient's last A1c was:   Hemoglobin A1C   Date Value Ref Range Status   08/22/2024 6.1 (H) 4.2 - 5.6 % Final     Comment:     (NOTE)  HbA1C Interpretive Ranges  <5.7              Normal  5.7 - 6.4         Consider Prediabetes  >6.5              Consider Diabetes         Interim update: Pt was last seen by me on 7/29/2024.  Per my prior note: Pt's A1c is at goal of < 7%.  Pt's

## 2024-09-30 ENCOUNTER — PHARMACY VISIT (OUTPATIENT)
Facility: CLINIC | Age: 64
End: 2024-09-30

## 2024-09-30 DIAGNOSIS — E11.9 CONTROLLED TYPE 2 DIABETES MELLITUS WITHOUT COMPLICATION, WITHOUT LONG-TERM CURRENT USE OF INSULIN (HCC): Primary | ICD-10-CM

## 2024-10-03 ENCOUNTER — TRANSCRIBE ORDERS (OUTPATIENT)
Facility: HOSPITAL | Age: 64
End: 2024-10-03

## 2024-10-03 DIAGNOSIS — Z12.31 VISIT FOR SCREENING MAMMOGRAM: Primary | ICD-10-CM

## 2024-10-22 ENCOUNTER — HOSPITAL ENCOUNTER (OUTPATIENT)
Facility: HOSPITAL | Age: 64
Discharge: HOME | End: 2024-10-25
Attending: INTERNAL MEDICINE
Payer: OTHER GOVERNMENT

## 2024-10-22 VITALS — WEIGHT: 170 LBS | BODY MASS INDEX: 31.28 KG/M2 | HEIGHT: 62 IN

## 2024-10-22 DIAGNOSIS — Z12.31 VISIT FOR SCREENING MAMMOGRAM: ICD-10-CM

## 2024-10-22 PROCEDURE — 77063 BREAST TOMOSYNTHESIS BI: CPT

## 2025-01-27 ENCOUNTER — HOSPITAL ENCOUNTER (OUTPATIENT)
Facility: HOSPITAL | Age: 65
Setting detail: SPECIMEN
Discharge: HOME | End: 2025-01-30
Payer: OTHER GOVERNMENT

## 2025-01-27 DIAGNOSIS — E11.9 CONTROLLED TYPE 2 DIABETES MELLITUS WITHOUT COMPLICATION, WITHOUT LONG-TERM CURRENT USE OF INSULIN (HCC): ICD-10-CM

## 2025-01-27 LAB
EST. AVERAGE GLUCOSE BLD GHB EST-MCNC: 134 MG/DL
HBA1C MFR BLD: 6.3 % (ref 4.2–5.6)

## 2025-01-27 PROCEDURE — 83036 HEMOGLOBIN GLYCOSYLATED A1C: CPT

## 2025-01-27 PROCEDURE — 36415 COLL VENOUS BLD VENIPUNCTURE: CPT

## 2025-02-03 ENCOUNTER — OFFICE VISIT (OUTPATIENT)
Facility: CLINIC | Age: 65
End: 2025-02-03
Payer: OTHER GOVERNMENT

## 2025-02-03 VITALS
HEART RATE: 98 BPM | DIASTOLIC BLOOD PRESSURE: 74 MMHG | BODY MASS INDEX: 30.36 KG/M2 | SYSTOLIC BLOOD PRESSURE: 121 MMHG | WEIGHT: 165 LBS | TEMPERATURE: 98.1 F | HEIGHT: 62 IN | RESPIRATION RATE: 20 BRPM | OXYGEN SATURATION: 97 %

## 2025-02-03 DIAGNOSIS — J06.9 UPPER RESPIRATORY TRACT INFECTION, UNSPECIFIED TYPE: ICD-10-CM

## 2025-02-03 DIAGNOSIS — E11.9 CONTROLLED TYPE 2 DIABETES MELLITUS WITHOUT COMPLICATION, WITHOUT LONG-TERM CURRENT USE OF INSULIN (HCC): Primary | ICD-10-CM

## 2025-02-03 PROCEDURE — 99214 OFFICE O/P EST MOD 30 MIN: CPT | Performed by: INTERNAL MEDICINE

## 2025-02-03 PROCEDURE — 3044F HG A1C LEVEL LT 7.0%: CPT | Performed by: INTERNAL MEDICINE

## 2025-02-03 SDOH — ECONOMIC STABILITY: FOOD INSECURITY: WITHIN THE PAST 12 MONTHS, THE FOOD YOU BOUGHT JUST DIDN'T LAST AND YOU DIDN'T HAVE MONEY TO GET MORE.: NEVER TRUE

## 2025-02-03 SDOH — ECONOMIC STABILITY: FOOD INSECURITY: WITHIN THE PAST 12 MONTHS, YOU WORRIED THAT YOUR FOOD WOULD RUN OUT BEFORE YOU GOT MONEY TO BUY MORE.: NEVER TRUE

## 2025-02-03 ASSESSMENT — PATIENT HEALTH QUESTIONNAIRE - PHQ9
SUM OF ALL RESPONSES TO PHQ QUESTIONS 1-9: 0
SUM OF ALL RESPONSES TO PHQ9 QUESTIONS 1 & 2: 0
SUM OF ALL RESPONSES TO PHQ QUESTIONS 1-9: 0
2. FEELING DOWN, DEPRESSED OR HOPELESS: NOT AT ALL
SUM OF ALL RESPONSES TO PHQ QUESTIONS 1-9: 0
1. LITTLE INTEREST OR PLEASURE IN DOING THINGS: NOT AT ALL
SUM OF ALL RESPONSES TO PHQ QUESTIONS 1-9: 0

## 2025-02-03 NOTE — PROGRESS NOTES
Damaris Schmid presents today for   Chief Complaint   Patient presents with    Follow-up           \"Have you been to the ER, urgent care clinic since your last visit?  Hospitalized since your last visit?\"    NO    “Have you seen or consulted any other health care providers outside of [facility] since your last visit?”    NO        “Have you had a pap smear?”    YES - Where: [facility] Nurse/CMA to request most recent records if not in the chart    No cervical cancer screening on file          
    Physical Exam  Constitutional:       Appearance: Normal appearance.   HENT:      Head: Normocephalic and atraumatic.      Right Ear: Tympanic membrane and external ear normal.      Left Ear: Tympanic membrane and external ear normal.      Nose: Nose normal.      Mouth/Throat:      Pharynx: Posterior oropharyngeal erythema present. No oropharyngeal exudate.      Comments: She has a rim of erythema along her posterior oropharynx.  Eyes:      General: No scleral icterus.        Right eye: No discharge.         Left eye: No discharge.      Conjunctiva/sclera: Conjunctivae normal.   Cardiovascular:      Rate and Rhythm: Normal rate and regular rhythm.      Pulses: Normal pulses.      Heart sounds: Normal heart sounds.   Pulmonary:      Effort: Pulmonary effort is normal.      Breath sounds: Normal breath sounds.   Abdominal:      General: Abdomen is flat. Bowel sounds are normal. There is no distension.      Palpations: Abdomen is soft.      Tenderness: There is no abdominal tenderness.   Musculoskeletal:         General: No swelling (BUE) or tenderness (BUE).      Cervical back: Neck supple.   Lymphadenopathy:      Cervical: No cervical adenopathy.   Skin:     General: Skin is warm and dry.      Findings: No erythema.   Neurological:      Mental Status: She is alert. Mental status is at baseline.      Gait: Gait normal.   Psychiatric:         Mood and Affect: Mood normal.           LABS   Data Review:   Lab Results   Component Value Date/Time    WBC 6.2 11/14/2023 09:03 AM    HGB 13.3 11/14/2023 09:03 AM    HCT 40.0 11/14/2023 09:03 AM     11/14/2023 09:03 AM    MCV 95.9 11/14/2023 09:03 AM       Lab Results   Component Value Date/Time     08/22/2024 09:24 AM    K 4.7 08/22/2024 09:24 AM     08/22/2024 09:24 AM    CO2 26 08/22/2024 09:24 AM    BUN 14 08/22/2024 09:24 AM    GFRAA >60 07/08/2022 08:40 AM       Lab Results   Component Value Date/Time    CHOL 178 08/22/2024 09:24 AM    HDL 57

## 2025-02-03 NOTE — PATIENT INSTRUCTIONS
Learning About Tests When You Have Diabetes  Why do you need regular tests?     Diabetes can lead to other health problems if it's not well managed. You'll need tests to monitor how well your diabetes is managed and to check for other things like high cholesterol or kidney problems. Having tests on a regular schedule can help your doctor find problems early, when it's best to start treating them.  What tests do you need?  These are the tests you may need and how often you should have them. The tests may vary depending on whether you have type 1 or type 2 diabetes.  A1c blood test.  This test shows the average level of blood sugar over the past 2 to 3 months. It helps your doctor see whether blood sugar levels have been staying within your target range.  How often: Every 3 to 6 months  Goal: A blood sugar level in your target range  Blood pressure test.  This test measures the pressure of blood flow in the arteries. Controlling blood pressure can help prevent damage to nerves and blood vessels.  How often: Every 3 to 6 months  Goal: A blood pressure level in your target range  Cholesterol test.  This test measures the amount of a type of fat in the blood. It is common for people with diabetes to also have high cholesterol. Too much cholesterol in the blood can build up inside the blood vessels and raise the risk for heart attack and stroke.  How often: At the time of your diabetes diagnosis, and as often as your doctor recommends after that  Goal: A cholesterol level in your target range  Albumin-creatinine ratio test.  This test checks for kidney damage by looking for the protein albumin (say \"al-BYOO-adriana\") in the urine. Albumin is normally found in the blood. Kidney damage can let small amounts of it (microalbumin) leak into the urine.  How often: Once a year  Goal: No protein in the urine  Blood creatinine test/estimated glomerular filtration (eGFR).  The blood creatinine (say \"fiof-BY-ug-neen\") level shows

## 2025-03-19 NOTE — PROGRESS NOTES
Pharmacy Progress Note - Diabetes Management       Assessment / Plan:   Diabetes Management:  Per ADA guidelines, Pt's A1c is at goal of < 7%.  A1c has progressively been increasing, but still in the prediabetes range.  The majority of her SMBG logs are at goal with occasional elevations from dietary indiscretions.  Encouraged to improve her diet to improve her glycemic control.  Will reassess with SMBG logs at follow up in 6 months.     Nutrition/Lifestyle Modifications:  - Educated pt on the importance of moderating carbohydrate intake. Reviewed sources of carbohydrates and method to help determine appropriate portion sizes (e.g., Diabetes Plate Method).  - Advised patient to avoid sugar-sweetened beverages and replace with water or diet/zero sugar option.  - Recommend ~30 minutes consistent, moderately intensive, exercise/day or ~150 minutes/week. Start small, stay consistent, and increase length and types of exercise, as tolerated.       Patient will return to clinic in 6 month(s) for follow up.        S/O: Ms. Damaris Schmid, a 65 y.o. female referred by Roseline Welsh MD,  has a past medical history of Fatty liver and GERD (gastroesophageal reflux disease).  Pt was seen today for diabetes management.  Patient's last A1c was:   Hemoglobin A1C   Date Value Ref Range Status   01/27/2025 6.3 (H) 4.2 - 5.6 % Final     Comment:     (NOTE)  HbA1C Interpretive Ranges  <5.7              Normal  5.7 - 6.4         Consider Prediabetes  >6.5              Consider Diabetes         Interim update: Pt was last seen by me on 9/30/2024.  Per my prior note: Pt's A1c is at goal of < 7%.  Pt's A1c and glycemic control are maintained on her current tx.  She is tolerating the Jardiance well at this time.  Will maintain her current tx and will reassess with SMBG logs at follow up in 6 months.    Today:   Pt states that she will rarely have some dietary indiscretions, but has overall maintained her diabetic diet.  She

## 2025-03-24 ENCOUNTER — PHARMACY VISIT (OUTPATIENT)
Facility: CLINIC | Age: 65
End: 2025-03-24

## 2025-03-24 DIAGNOSIS — E11.9 CONTROLLED TYPE 2 DIABETES MELLITUS WITHOUT COMPLICATION, WITHOUT LONG-TERM CURRENT USE OF INSULIN: Primary | ICD-10-CM

## 2025-06-02 ENCOUNTER — HOSPITAL ENCOUNTER (OUTPATIENT)
Facility: HOSPITAL | Age: 65
Setting detail: SPECIMEN
Discharge: HOME | End: 2025-06-05
Payer: OTHER GOVERNMENT

## 2025-06-02 DIAGNOSIS — E11.9 CONTROLLED TYPE 2 DIABETES MELLITUS WITHOUT COMPLICATION, WITHOUT LONG-TERM CURRENT USE OF INSULIN (HCC): ICD-10-CM

## 2025-06-02 LAB
ALBUMIN SERPL-MCNC: 3.7 G/DL (ref 3.4–5)
ALBUMIN/GLOB SERPL: 1.1 (ref 0.8–1.7)
ALP SERPL-CCNC: 81 U/L (ref 45–117)
ALT SERPL-CCNC: 15 U/L (ref 10–35)
ANION GAP SERPL CALC-SCNC: 13 MMOL/L (ref 3–18)
AST SERPL-CCNC: 21 U/L (ref 10–38)
BILIRUB SERPL-MCNC: 0.6 MG/DL (ref 0.2–1)
BUN SERPL-MCNC: 11 MG/DL (ref 6–23)
BUN/CREAT SERPL: 11 (ref 12–20)
CALCIUM SERPL-MCNC: 10 MG/DL (ref 8.5–10.1)
CHLORIDE SERPL-SCNC: 102 MMOL/L (ref 98–107)
CO2 SERPL-SCNC: 25 MMOL/L (ref 21–32)
CREAT SERPL-MCNC: 1 MG/DL (ref 0.6–1.3)
EST. AVERAGE GLUCOSE BLD GHB EST-MCNC: 146 MG/DL
GLOBULIN SER CALC-MCNC: 3.4 G/DL (ref 2–4)
GLUCOSE SERPL-MCNC: 150 MG/DL (ref 74–108)
HBA1C MFR BLD: 6.7 % (ref 4.2–5.6)
POTASSIUM SERPL-SCNC: 4.6 MMOL/L (ref 3.5–5.5)
PROT SERPL-MCNC: 7 G/DL (ref 6.4–8.2)
SODIUM SERPL-SCNC: 140 MMOL/L (ref 136–145)

## 2025-06-02 PROCEDURE — 83036 HEMOGLOBIN GLYCOSYLATED A1C: CPT

## 2025-06-02 PROCEDURE — 36415 COLL VENOUS BLD VENIPUNCTURE: CPT

## 2025-06-02 PROCEDURE — 80053 COMPREHEN METABOLIC PANEL: CPT

## 2025-06-09 ENCOUNTER — OFFICE VISIT (OUTPATIENT)
Facility: CLINIC | Age: 65
End: 2025-06-09
Payer: OTHER GOVERNMENT

## 2025-06-09 VITALS
RESPIRATION RATE: 16 BRPM | SYSTOLIC BLOOD PRESSURE: 132 MMHG | BODY MASS INDEX: 29.81 KG/M2 | DIASTOLIC BLOOD PRESSURE: 88 MMHG | HEIGHT: 62 IN | HEART RATE: 76 BPM | WEIGHT: 162 LBS | OXYGEN SATURATION: 97 % | TEMPERATURE: 97.4 F

## 2025-06-09 DIAGNOSIS — E11.9 CONTROLLED TYPE 2 DIABETES MELLITUS WITHOUT COMPLICATION, WITHOUT LONG-TERM CURRENT USE OF INSULIN (HCC): Primary | ICD-10-CM

## 2025-06-09 DIAGNOSIS — K59.09 CHRONIC CONSTIPATION: ICD-10-CM

## 2025-06-09 DIAGNOSIS — E78.2 MIXED HYPERLIPIDEMIA: ICD-10-CM

## 2025-06-09 DIAGNOSIS — K75.81 NASH (NONALCOHOLIC STEATOHEPATITIS): ICD-10-CM

## 2025-06-09 PROCEDURE — 3044F HG A1C LEVEL LT 7.0%: CPT | Performed by: INTERNAL MEDICINE

## 2025-06-09 PROCEDURE — 99214 OFFICE O/P EST MOD 30 MIN: CPT | Performed by: INTERNAL MEDICINE

## 2025-06-09 RX ORDER — PRAVASTATIN SODIUM 40 MG
40 TABLET ORAL NIGHTLY
Qty: 90 TABLET | Refills: 3 | Status: SHIPPED | OUTPATIENT
Start: 2025-06-09

## 2025-06-09 ASSESSMENT — ENCOUNTER SYMPTOMS
ANAL BLEEDING: 0
SHORTNESS OF BREATH: 0
EYE PAIN: 0
CONSTIPATION: 1
ABDOMINAL PAIN: 1
BLOOD IN STOOL: 0
COUGH: 0
SORE THROAT: 0

## 2025-06-09 NOTE — PATIENT INSTRUCTIONS
Learning About Tests When You Have Diabetes  Why do you need regular tests?     Diabetes can lead to other health problems if it's not well managed. You'll need tests to monitor how well your diabetes is managed and to check for other things like high cholesterol or kidney problems. Having tests on a regular schedule can help your doctor find problems early, when it's best to start treating them.  What tests do you need?  These are the tests you may need and how often you should have them. The tests may vary depending on whether you have type 1 or type 2 diabetes.  A1c blood test.  This test shows the average level of blood sugar over the past 2 to 3 months. It helps your doctor see whether blood sugar levels have been staying within your target range.  How often: Every 3 to 6 months  Goal: A blood sugar level in your target range  Blood pressure test.  This test measures the pressure of blood flow in the arteries. Controlling blood pressure can help prevent damage to nerves and blood vessels.  How often: Every 3 to 6 months  Goal: A blood pressure level in your target range  Cholesterol test.  This test measures the amount of a type of fat in the blood. It is common for people with diabetes to also have high cholesterol. Too much cholesterol in the blood can build up inside the blood vessels and raise the risk for heart attack and stroke.  How often: At the time of your diabetes diagnosis, and as often as your doctor recommends after that  Goal: A cholesterol level in your target range  Albumin-creatinine ratio test.  This test checks for kidney damage by looking for the protein albumin (say \"al-BYOO-adriana\") in the urine. Albumin is normally found in the blood. Kidney damage can let small amounts of it (microalbumin) leak into the urine.  How often: Once a year  Goal: No protein in the urine  Blood creatinine test/estimated glomerular filtration (eGFR).  The blood creatinine (say \"lguh-QW-iv-neen\") level shows

## 2025-06-09 NOTE — PROGRESS NOTES
INTERNISTS OF [facility]:  6/9/2025, MRN: [id number]      Damaris Schmid is a 65 y.o. female and presents to clinic for Follow-up      Subjective:   The patient is a 65-year-old female with history of eczema, vitiligo, genital herpes, type 2 diabetes, HLD, abnormal Pap smear, hemorrhoids, and BESS.      1.  NAFLD: Her most recent labs show that her LFTs are well.  No alcohol.    2.  Type 2 diabetes: Her A1c has been slowly increasing over the past year.  Taking Jardiance 25 mg daily. Last eye exam: overdue. Vision: no blurry vision. +Wear glasses.     Latest Reference Range & Units 08/22/24 09:24 01/27/25 10:52 06/02/25 09:52   Hemoglobin A1C 4.2 - 5.6 % 6.1 (H) 6.3 (H) 6.7 (H)   (H): Data is abnormally high    3.  Hyperlipidemia: Treated with pravastatin 40 mg daily.  No adverse side effects. She ran out of pravastatin 1 month ago.    4. Chronic Constipation: Present for >4 months. She responded well to linzess. She has tried multiple medications: miralax, MOM, Benefiber, docusate, and metamucil. No rectal bleeding. Sx worsened after [facility] stopped filling her linzess rx. She has had BRBPR with straining. She believes it is from hemorrhoids. She's had right sided abdominal pain off/on. This pain is triggered by \"if I haven't gone to the bathroom. I feel bloated taking that liquid [MOM] or miralax.\" Abdominal pain will be triggered by 1 wk of having constipation. No emesis. Abdominal pain improves after a good BM.       Patient Active Problem List    Diagnosis Date Noted    Allergic rhinitis 09/22/2022    Borderline glaucoma, open angle with borderline findings 09/22/2022    Chronic constipation 09/22/2022    Obesity 09/22/2022    Controlled type 2 diabetes mellitus without complication, without long-term current use of insulin ([facility]) 11/09/2021    Gastritis 07/10/2020    History of abnormal cervical Pap smear 07/15/2019    Mixed hyperlipidemia 07/15/2019    Genital herpes 05/31/2018    BESS (nonalcoholic

## 2025-06-09 NOTE — PROGRESS NOTES
Damaris Schmid presents today for   Chief Complaint   Patient presents with    Follow-up       \"Have you been to the ER, urgent care clinic since your last visit?  Hospitalized since your last visit?\"    NO    “Have you seen or consulted any other health care providers outside of [facility] since your last visit?”    NO        “Have you had a pap smear?”    October 2024  [facility] [place]    No cervical cancer screening on file